# Patient Record
Sex: FEMALE | Race: WHITE | NOT HISPANIC OR LATINO
[De-identification: names, ages, dates, MRNs, and addresses within clinical notes are randomized per-mention and may not be internally consistent; named-entity substitution may affect disease eponyms.]

---

## 2020-08-04 PROBLEM — Z00.00 ENCOUNTER FOR PREVENTIVE HEALTH EXAMINATION: Status: ACTIVE | Noted: 2020-08-04

## 2020-08-05 ENCOUNTER — APPOINTMENT (OUTPATIENT)
Dept: ANTEPARTUM | Facility: CLINIC | Age: 30
End: 2020-08-05
Payer: COMMERCIAL

## 2020-08-05 ENCOUNTER — ASOB RESULT (OUTPATIENT)
Age: 30
End: 2020-08-05

## 2020-08-05 PROCEDURE — 76817 TRANSVAGINAL US OBSTETRIC: CPT

## 2020-08-05 PROCEDURE — 76811 OB US DETAILED SNGL FETUS: CPT

## 2020-12-13 ENCOUNTER — OUTPATIENT (OUTPATIENT)
Dept: OUTPATIENT SERVICES | Facility: HOSPITAL | Age: 30
LOS: 1 days | End: 2020-12-13
Payer: COMMERCIAL

## 2020-12-13 VITALS — HEART RATE: 83 BPM | SYSTOLIC BLOOD PRESSURE: 129 MMHG | DIASTOLIC BLOOD PRESSURE: 85 MMHG

## 2020-12-13 VITALS
TEMPERATURE: 98 F | RESPIRATION RATE: 19 BRPM | HEART RATE: 103 BPM | DIASTOLIC BLOOD PRESSURE: 86 MMHG | SYSTOLIC BLOOD PRESSURE: 125 MMHG

## 2020-12-13 DIAGNOSIS — O26.899 OTHER SPECIFIED PREGNANCY RELATED CONDITIONS, UNSPECIFIED TRIMESTER: ICD-10-CM

## 2020-12-13 DIAGNOSIS — Z98.890 OTHER SPECIFIED POSTPROCEDURAL STATES: Chronic | ICD-10-CM

## 2020-12-13 DIAGNOSIS — Z3A.00 WEEKS OF GESTATION OF PREGNANCY NOT SPECIFIED: ICD-10-CM

## 2020-12-13 PROCEDURE — G0463: CPT

## 2020-12-13 PROCEDURE — 59025 FETAL NON-STRESS TEST: CPT

## 2020-12-13 NOTE — OB PROVIDER TRIAGE NOTE - NSOBPROVIDERNOTE_OBGYN_ALL_OB_FT
29yo  @39w presenting c/o leakage of fluid. Reports that at 3am, she went to void and felt a gush of fluid in toilet. Leakage stopped. When she woke up in the morning her underwear was wet. Denies vaginal bleeding. Denies contractions. Endorses good fetal movement. Reports an otherwise uncomplicated pregnancy.    GBS neg    OBHx: H/o chemical pregnancy (2016), H/o mab w/ D+C (2019).  GYNHx: Denies  MHx: Denies  SHx: D+C (2019), L knee surgery (lateral release)  PsychHx: Denies  SocialHx: Denies    Allergies: Denies  Meds: PNV    ICU Vital Signs Last 24 Hrs  T(C): 36.8 (13 Dec 2020 13:21), Max: 36.8 (13 Dec 2020 13:02)  T(F): 98.2 (13 Dec 2020 13:21), Max: 98.24 (13 Dec 2020 13:02)  HR: 94 (13 Dec 2020 13:27) (88 - 103)  BP: 125/86 (13 Dec 2020 13:21) (125/86 - 125/86)  BP(mean): --  ABP: --  ABP(mean): --  RR: 19 (13 Dec 2020 13:21) (19 - 19)  SpO2: 93% (13 Dec 2020 13:27) (92% - 96%)    Physical Exam:  General: NAD, well appearing  Heart: RRR  Lungs: CTA b/l  Abdomen: Soft, gravid, NT, NR, NG  Extremities: No calf tenderness b/l    SSE: No pooling, nitrizine positive (blood staining), ferning neg    SVE: /-3    Assessment:  29yo  @39w r/o PROM. Vitals stable.    Plan:  -NST  -LEO on sono  -Dispo pending    D/w Dr. Reyes (PMD)    Claudette Goyal PA-C 29yo  @39w presenting c/o leakage of fluid. Reports that at 3am, she went to void and felt a gush of fluid in toilet. Leakage stopped. When she woke up in the morning her underwear was wet. Denies vaginal bleeding. Denies contractions. Endorses good fetal movement. Reports an otherwise uncomplicated pregnancy.    GBS neg    OBHx: H/o chemical pregnancy (2016), H/o mab w/ D+C (2019).  GYNHx: Denies  MHx: Denies  SHx: D+C (2019), L knee surgery (lateral release)  PsychHx: Denies  SocialHx: Denies    Allergies: Denies  Meds: PNV    ICU Vital Signs Last 24 Hrs  T(C): 36.8 (13 Dec 2020 13:21), Max: 36.8 (13 Dec 2020 13:02)  T(F): 98.2 (13 Dec 2020 13:21), Max: 98.24 (13 Dec 2020 13:02)  HR: 94 (13 Dec 2020 13:27) (88 - 103)  BP: 125/86 (13 Dec 2020 13:21) (125/86 - 125/86)  BP(mean): --  ABP: --  ABP(mean): --  RR: 19 (13 Dec 2020 13:21) (19 - 19)  SpO2: 93% (13 Dec 2020 13:27) (92% - 96%)    Physical Exam:  General: NAD, well appearing  Heart: RRR  Lungs: CTA b/l  Abdomen: Soft, gravid, NT, NR, NG  Extremities: No calf tenderness b/l    SSE: No pooling, nitrizine positive (blood staining), ferning neg    SVE: /-3    Assessment:  29yo  @39w r/o PROM. Vitals stable.    Plan:  -NST  -LEO on sono  -Dispo pending    D/w Dr. Reyes (PMD)    Claudette Goyal PA-C      ---      Addendum:   Sono: Vertex, LEO 10.08, anterior placenta    Plan:  -D/c home w/ labor precautions    D/w Dr. SULY Reyes (MD)    Claudette Goyal Pa-C

## 2020-12-13 NOTE — OB PROVIDER TRIAGE NOTE - HISTORY OF PRESENT ILLNESS
31yo  @39w presenting c/o leakage of fluid. Reports that at 3am, she went to void and felt a gush of fluid in toilet. Leakage stopped. When she woke up in the morning her underwear was wet. Denies vaginal bleeding. Denies contractions. Endorses good fetal movement. Reports an otherwise uncomplicated pregnancy.    GBS neg    OBHx: H/o chemical pregnancy (2016), H/o mab w/ D+C (2019).  GYNHx: Denies  MHx: Denies  SHx: D+C (2019), L knee surgery (lateral release)  PsychHx: Denies  SocialHx: Denies    Allergies: Denies  Meds: PNV    ICU Vital Signs Last 24 Hrs  T(C): 36.8 (13 Dec 2020 13:21), Max: 36.8 (13 Dec 2020 13:02)  T(F): 98.2 (13 Dec 2020 13:21), Max: 98.24 (13 Dec 2020 13:02)  HR: 94 (13 Dec 2020 13:27) (88 - 103)  BP: 125/86 (13 Dec 2020 13:21) (125/86 - 125/86)  BP(mean): --  ABP: --  ABP(mean): --  RR: 19 (13 Dec 2020 13:21) (19 - 19)  SpO2: 93% (13 Dec 2020 13:27) (92% - 96%)    Physical Exam:  General: NAD, well appearing  Heart: RRR  Lungs: CTA b/l  Abdomen: Soft, gravid, NT, NR, NG  Extremities: No calf tenderness b/l    SSE: No pooling, nitrizine positive (blood staining), ferning neg    SVE: /-3    Assessment:  31yo  @39w r/o PROM. Vitals stable.    Plan:  -NST  -LEO on sono  -Dispo pending    D/w Dr. Reyes (PMD)    Claudette Goyal PA-C

## 2020-12-14 ENCOUNTER — TRANSCRIPTION ENCOUNTER (OUTPATIENT)
Age: 30
End: 2020-12-14

## 2020-12-15 ENCOUNTER — INPATIENT (INPATIENT)
Facility: HOSPITAL | Age: 30
LOS: 0 days | Discharge: HOME | End: 2020-12-16
Attending: OBSTETRICS & GYNECOLOGY | Admitting: OBSTETRICS & GYNECOLOGY
Payer: COMMERCIAL

## 2020-12-15 VITALS
RESPIRATION RATE: 18 BRPM | DIASTOLIC BLOOD PRESSURE: 97 MMHG | SYSTOLIC BLOOD PRESSURE: 138 MMHG | OXYGEN SATURATION: 99 % | HEART RATE: 105 BPM | TEMPERATURE: 99 F

## 2020-12-15 DIAGNOSIS — Z34.80 ENCOUNTER FOR SUPERVISION OF OTHER NORMAL PREGNANCY, UNSPECIFIED TRIMESTER: ICD-10-CM

## 2020-12-15 DIAGNOSIS — Z3A.00 WEEKS OF GESTATION OF PREGNANCY NOT SPECIFIED: ICD-10-CM

## 2020-12-15 DIAGNOSIS — Z98.890 OTHER SPECIFIED POSTPROCEDURAL STATES: Chronic | ICD-10-CM

## 2020-12-15 DIAGNOSIS — O26.899 OTHER SPECIFIED PREGNANCY RELATED CONDITIONS, UNSPECIFIED TRIMESTER: ICD-10-CM

## 2020-12-15 LAB
ALBUMIN SERPL ELPH-MCNC: 3.8 G/DL — SIGNIFICANT CHANGE UP (ref 3.3–5)
ALP SERPL-CCNC: 147 U/L — HIGH (ref 40–120)
ALT FLD-CCNC: 16 U/L — SIGNIFICANT CHANGE UP (ref 10–45)
ANION GAP SERPL CALC-SCNC: 15 MMOL/L — SIGNIFICANT CHANGE UP (ref 5–17)
APTT BLD: 27 SEC — LOW (ref 27.5–35.5)
AST SERPL-CCNC: 23 U/L — SIGNIFICANT CHANGE UP (ref 10–40)
BASOPHILS # BLD AUTO: 0.1 K/UL — SIGNIFICANT CHANGE UP (ref 0–0.2)
BASOPHILS NFR BLD AUTO: 0.7 % — SIGNIFICANT CHANGE UP (ref 0–2)
BILIRUB SERPL-MCNC: 0.2 MG/DL — SIGNIFICANT CHANGE UP (ref 0.2–1.2)
BLD GP AB SCN SERPL QL: NEGATIVE — SIGNIFICANT CHANGE UP
BUN SERPL-MCNC: 7 MG/DL — SIGNIFICANT CHANGE UP (ref 7–23)
CALCIUM SERPL-MCNC: 10 MG/DL — SIGNIFICANT CHANGE UP (ref 8.4–10.5)
CHLORIDE SERPL-SCNC: 102 MMOL/L — SIGNIFICANT CHANGE UP (ref 96–108)
CO2 SERPL-SCNC: 19 MMOL/L — LOW (ref 22–31)
CREAT SERPL-MCNC: 0.54 MG/DL — SIGNIFICANT CHANGE UP (ref 0.5–1.3)
EOSINOPHIL # BLD AUTO: 0.25 K/UL — SIGNIFICANT CHANGE UP (ref 0–0.5)
EOSINOPHIL NFR BLD AUTO: 1.7 % — SIGNIFICANT CHANGE UP (ref 0–6)
FIBRINOGEN PPP-MCNC: 894 MG/DL — HIGH (ref 290–520)
GLUCOSE SERPL-MCNC: 91 MG/DL — SIGNIFICANT CHANGE UP (ref 70–99)
HCT VFR BLD CALC: 39.7 % — SIGNIFICANT CHANGE UP (ref 34.5–45)
HGB BLD-MCNC: 13.1 G/DL — SIGNIFICANT CHANGE UP (ref 11.5–15.5)
IMM GRANULOCYTES NFR BLD AUTO: 3.9 % — HIGH (ref 0–1.5)
INR BLD: 1 RATIO — SIGNIFICANT CHANGE UP (ref 0.88–1.16)
LDH SERPL L TO P-CCNC: 188 U/L — SIGNIFICANT CHANGE UP (ref 50–242)
LYMPHOCYTES # BLD AUTO: 17.3 % — SIGNIFICANT CHANGE UP (ref 13–44)
LYMPHOCYTES # BLD AUTO: 2.5 K/UL — SIGNIFICANT CHANGE UP (ref 1–3.3)
MCHC RBC-ENTMCNC: 29 PG — SIGNIFICANT CHANGE UP (ref 27–34)
MCHC RBC-ENTMCNC: 33 GM/DL — SIGNIFICANT CHANGE UP (ref 32–36)
MCV RBC AUTO: 88 FL — SIGNIFICANT CHANGE UP (ref 80–100)
MONOCYTES # BLD AUTO: 1.39 K/UL — HIGH (ref 0–0.9)
MONOCYTES NFR BLD AUTO: 9.6 % — SIGNIFICANT CHANGE UP (ref 2–14)
NEUTROPHILS # BLD AUTO: 9.68 K/UL — HIGH (ref 1.8–7.4)
NEUTROPHILS NFR BLD AUTO: 66.8 % — SIGNIFICANT CHANGE UP (ref 43–77)
NRBC # BLD: 0 /100 WBCS — SIGNIFICANT CHANGE UP (ref 0–0)
PLATELET # BLD AUTO: 242 K/UL — SIGNIFICANT CHANGE UP (ref 150–400)
POTASSIUM SERPL-MCNC: 3.7 MMOL/L — SIGNIFICANT CHANGE UP (ref 3.5–5.3)
POTASSIUM SERPL-SCNC: 3.7 MMOL/L — SIGNIFICANT CHANGE UP (ref 3.5–5.3)
PROT SERPL-MCNC: 6.5 G/DL — SIGNIFICANT CHANGE UP (ref 6–8.3)
PROTHROM AB SERPL-ACNC: 12 SEC — SIGNIFICANT CHANGE UP (ref 10.6–13.6)
RBC # BLD: 4.51 M/UL — SIGNIFICANT CHANGE UP (ref 3.8–5.2)
RBC # FLD: 13.9 % — SIGNIFICANT CHANGE UP (ref 10.3–14.5)
RH IG SCN BLD-IMP: POSITIVE — SIGNIFICANT CHANGE UP
SARS-COV-2 IGG SERPL QL IA: NEGATIVE — SIGNIFICANT CHANGE UP
SARS-COV-2 IGM SERPL IA-ACNC: 0.14 INDEX — SIGNIFICANT CHANGE UP
SARS-COV-2 RNA SPEC QL NAA+PROBE: SIGNIFICANT CHANGE UP
SODIUM SERPL-SCNC: 136 MMOL/L — SIGNIFICANT CHANGE UP (ref 135–145)
T PALLIDUM AB TITR SER: NEGATIVE — SIGNIFICANT CHANGE UP
URATE SERPL-MCNC: 3.3 MG/DL — SIGNIFICANT CHANGE UP (ref 2.5–7)
WBC # BLD: 14.49 K/UL — HIGH (ref 3.8–10.5)
WBC # FLD AUTO: 14.49 K/UL — HIGH (ref 3.8–10.5)

## 2020-12-15 RX ORDER — LANOLIN
1 OINTMENT (GRAM) TOPICAL EVERY 6 HOURS
Refills: 0 | Status: DISCONTINUED | OUTPATIENT
Start: 2020-12-15 | End: 2020-12-16

## 2020-12-15 RX ORDER — MAGNESIUM HYDROXIDE 400 MG/1
30 TABLET, CHEWABLE ORAL
Refills: 0 | Status: DISCONTINUED | OUTPATIENT
Start: 2020-12-15 | End: 2020-12-16

## 2020-12-15 RX ORDER — OXYCODONE HYDROCHLORIDE 5 MG/1
5 TABLET ORAL ONCE
Refills: 0 | Status: DISCONTINUED | OUTPATIENT
Start: 2020-12-15 | End: 2020-12-16

## 2020-12-15 RX ORDER — BENZOCAINE 10 %
1 GEL (GRAM) MUCOUS MEMBRANE EVERY 6 HOURS
Refills: 0 | Status: DISCONTINUED | OUTPATIENT
Start: 2020-12-15 | End: 2020-12-16

## 2020-12-15 RX ORDER — PRAMOXINE HYDROCHLORIDE 150 MG/15G
1 AEROSOL, FOAM RECTAL EVERY 4 HOURS
Refills: 0 | Status: DISCONTINUED | OUTPATIENT
Start: 2020-12-15 | End: 2020-12-16

## 2020-12-15 RX ORDER — SODIUM CHLORIDE 9 MG/ML
1000 INJECTION, SOLUTION INTRAVENOUS
Refills: 0 | Status: DISCONTINUED | OUTPATIENT
Start: 2020-12-15 | End: 2020-12-15

## 2020-12-15 RX ORDER — INFLUENZA VIRUS VACCINE 15; 15; 15; 15 UG/.5ML; UG/.5ML; UG/.5ML; UG/.5ML
0.5 SUSPENSION INTRAMUSCULAR ONCE
Refills: 0 | Status: DISCONTINUED | OUTPATIENT
Start: 2020-12-15 | End: 2020-12-16

## 2020-12-15 RX ORDER — AER TRAVELER 0.5 G/1
1 SOLUTION RECTAL; TOPICAL EVERY 4 HOURS
Refills: 0 | Status: DISCONTINUED | OUTPATIENT
Start: 2020-12-15 | End: 2020-12-16

## 2020-12-15 RX ORDER — HYDROCORTISONE 1 %
1 OINTMENT (GRAM) TOPICAL EVERY 6 HOURS
Refills: 0 | Status: DISCONTINUED | OUTPATIENT
Start: 2020-12-15 | End: 2020-12-16

## 2020-12-15 RX ORDER — SODIUM CHLORIDE 9 MG/ML
3 INJECTION INTRAMUSCULAR; INTRAVENOUS; SUBCUTANEOUS EVERY 8 HOURS
Refills: 0 | Status: DISCONTINUED | OUTPATIENT
Start: 2020-12-15 | End: 2020-12-16

## 2020-12-15 RX ORDER — DIBUCAINE 1 %
1 OINTMENT (GRAM) RECTAL EVERY 6 HOURS
Refills: 0 | Status: DISCONTINUED | OUTPATIENT
Start: 2020-12-15 | End: 2020-12-16

## 2020-12-15 RX ORDER — ONDANSETRON 8 MG/1
4 TABLET, FILM COATED ORAL ONCE
Refills: 0 | Status: COMPLETED | OUTPATIENT
Start: 2020-12-15 | End: 2020-12-15

## 2020-12-15 RX ORDER — CITRIC ACID/SODIUM CITRATE 300-500 MG
15 SOLUTION, ORAL ORAL EVERY 6 HOURS
Refills: 0 | Status: DISCONTINUED | OUTPATIENT
Start: 2020-12-15 | End: 2020-12-15

## 2020-12-15 RX ORDER — DIPHENHYDRAMINE HCL 50 MG
25 CAPSULE ORAL EVERY 6 HOURS
Refills: 0 | Status: DISCONTINUED | OUTPATIENT
Start: 2020-12-15 | End: 2020-12-16

## 2020-12-15 RX ORDER — SIMETHICONE 80 MG/1
80 TABLET, CHEWABLE ORAL EVERY 4 HOURS
Refills: 0 | Status: DISCONTINUED | OUTPATIENT
Start: 2020-12-15 | End: 2020-12-16

## 2020-12-15 RX ORDER — KETOROLAC TROMETHAMINE 30 MG/ML
30 SYRINGE (ML) INJECTION ONCE
Refills: 0 | Status: DISCONTINUED | OUTPATIENT
Start: 2020-12-15 | End: 2020-12-15

## 2020-12-15 RX ORDER — SODIUM CHLORIDE 9 MG/ML
1000 INJECTION, SOLUTION INTRAVENOUS ONCE
Refills: 0 | Status: COMPLETED | OUTPATIENT
Start: 2020-12-15 | End: 2020-12-15

## 2020-12-15 RX ORDER — IBUPROFEN 200 MG
600 TABLET ORAL EVERY 6 HOURS
Refills: 0 | Status: COMPLETED | OUTPATIENT
Start: 2020-12-15 | End: 2021-11-13

## 2020-12-15 RX ORDER — OXYCODONE HYDROCHLORIDE 5 MG/1
5 TABLET ORAL
Refills: 0 | Status: DISCONTINUED | OUTPATIENT
Start: 2020-12-15 | End: 2020-12-16

## 2020-12-15 RX ORDER — OXYTOCIN 10 UNIT/ML
333.33 VIAL (ML) INJECTION
Qty: 20 | Refills: 0 | Status: COMPLETED | OUTPATIENT
Start: 2020-12-15 | End: 2020-12-15

## 2020-12-15 RX ORDER — ACETAMINOPHEN 500 MG
975 TABLET ORAL
Refills: 0 | Status: DISCONTINUED | OUTPATIENT
Start: 2020-12-15 | End: 2020-12-16

## 2020-12-15 RX ORDER — TETANUS TOXOID, REDUCED DIPHTHERIA TOXOID AND ACELLULAR PERTUSSIS VACCINE, ADSORBED 5; 2.5; 8; 8; 2.5 [IU]/.5ML; [IU]/.5ML; UG/.5ML; UG/.5ML; UG/.5ML
0.5 SUSPENSION INTRAMUSCULAR ONCE
Refills: 0 | Status: DISCONTINUED | OUTPATIENT
Start: 2020-12-15 | End: 2020-12-16

## 2020-12-15 RX ORDER — OXYTOCIN 10 UNIT/ML
333.33 VIAL (ML) INJECTION
Qty: 20 | Refills: 0 | Status: DISCONTINUED | OUTPATIENT
Start: 2020-12-15 | End: 2020-12-16

## 2020-12-15 RX ORDER — IBUPROFEN 200 MG
600 TABLET ORAL EVERY 6 HOURS
Refills: 0 | Status: DISCONTINUED | OUTPATIENT
Start: 2020-12-15 | End: 2020-12-16

## 2020-12-15 RX ADMIN — Medication 975 MILLIGRAM(S): at 16:17

## 2020-12-15 RX ADMIN — SODIUM CHLORIDE 1000 MILLILITER(S): 9 INJECTION, SOLUTION INTRAVENOUS at 11:15

## 2020-12-15 RX ADMIN — SODIUM CHLORIDE 125 MILLILITER(S): 9 INJECTION, SOLUTION INTRAVENOUS at 02:10

## 2020-12-15 RX ADMIN — Medication 600 MILLIGRAM(S): at 19:49

## 2020-12-15 RX ADMIN — Medication 975 MILLIGRAM(S): at 20:48

## 2020-12-15 RX ADMIN — Medication 975 MILLIGRAM(S): at 21:48

## 2020-12-15 RX ADMIN — ONDANSETRON 4 MILLIGRAM(S): 8 TABLET, FILM COATED ORAL at 03:50

## 2020-12-15 RX ADMIN — Medication 1000 MILLIUNIT(S)/MIN: at 10:30

## 2020-12-15 RX ADMIN — Medication 600 MILLIGRAM(S): at 18:57

## 2020-12-15 RX ADMIN — Medication 30 MILLIGRAM(S): at 13:05

## 2020-12-15 RX ADMIN — Medication 975 MILLIGRAM(S): at 17:15

## 2020-12-15 NOTE — OB PROVIDER H&P - NSHPPHYSICALEXAM_GEN_ALL_CORE
Vital Signs Last 24 Hrs  T(C): 37.1 (15 Dec 2020 01:27), Max: 37.1 (15 Dec 2020 01:27)  T(F): 98.8 (15 Dec 2020 01:27), Max: 98.8 (15 Dec 2020 01:27)  HR: 105 (15 Dec 2020 01:27) (105 - 105)  BP: 138/97 (15 Dec 2020 01:27) (138/97 - 138/97)  BP(mean): --  RR: 18 (15 Dec 2020 01:27) (18 - 18)  SpO2: 99% (15 Dec 2020 01:27) (99% - 99%)    General: Uncomfortable with ctx's, A&Ox3  CV: RRR  Lungs: CTA b/l  Abdomen: Soft, NT, gravid

## 2020-12-15 NOTE — OB PROVIDER H&P - HISTORY OF PRESENT ILLNESS
30y  @39wks and 2 days gestation presenting with LOF. Patient admits to a gush of clear fluid @1215am with continuous leaking. She admits to regular contractions starting after LOF that she rates as a 10/10. She denies VB. PNC uncomplicated. +FM. GBS -. EFW 8#14 done by ultrasound yesterday.    POBHx: MAB x2 s/p D&Cx1  PGYNHx: Denies fibroids, ovarian cysts, abnormal pap smears, STD's  PMHx: Denies  Medications: PNV  Allergies: NKDA  PSHx: Knee surgery, D&C  Social Hx: Denies etoh/tobacco/drug use     Vital Signs Last 24 Hrs  T(C): 37.1 (15 Dec 2020 01:), Max: 37.1 (15 Dec 2020 01:)  T(F): 98.8 (15 Dec 2020 01:), Max: 98.8 (15 Dec 2020 01:)  HR: 105 (15 Dec 2020 01:) (105 - 105)  BP: 138/97 (15 Dec 2020 01:27) (138/97 - 138/97)  BP(mean): --  RR: 18 (15 Dec 2020 01:) (18 - 18)  SpO2: 99% (15 Dec 2020 01:) (99% - 99%)    General: Uncomfortable with ctx's, A&Ox3  CV: RRR  Lungs: CTA b/l  Abdomen: Soft, NT, gravid    VE: 3/80/-3, grossly ruptured  Bedside sono: Vertex presentation  EFM: 140/moderate variability/+accels/no decels  [place]: q2-4m

## 2020-12-15 NOTE — OB RN DELIVERY SUMMARY - NS_SEPSISRSKCALC_OBGYN_ALL_OB_FT
EOS calculated successfully. EOS Risk Factor: 0.5/1000 live births ([facility] national incidence); GA=39w2d; Temp=98.8; ROM=9.917; GBS='Negative'; Antibiotics='No antibiotics or any antibiotics < 2 hrs prior to birth'

## 2020-12-15 NOTE — OB RN PATIENT PROFILE - THE IMPORTANCE OF THE NEWBORN'S COMFORT AND THERMOREGULATION DURING SKIN TO SKIN: ANY PART OF INFANT SKIN NOT TOUCHING PARENT'S SKIN IS TO BE COVERED BY A BLANKET.
Patient Education     Vaginal Infection: Understanding the Vaginal Environment  The vagina is a canal. It connects the uterus (womb) to the outside of the body. It is home to many types of bacteria and other tiny organisms. These different bacteria most often stay balanced in number. This keeps the vagina healthy. If the balance changes, it can cause infection.   A healthy environment  Many types of bacteria are present in a healthy vagina. When balanced, they don t cause problems. Small amounts of yeast may also be present without causing problems. The most common type of bacteria in the vagina is lactobacillus. It helps keep the vagina at a low pH. A low pH keeps bad bacteria from taking over.  Normal vaginal discharge  The vagina makes fluid. It is sent out as discharge. This also keeps the vagina healthy. Normal discharge can be clear, white, or yellowish. Most women find that normal discharge varies in amount and color through the month.  An unhealthy environment  The vaginal environment may get out of balance. This may result in a vaginal infection. There are a few reasons this can happen. The pH may have changed. The amount of one organism, such as yeast, may increase. Or an outside organism may get into the vagina and throw off the balance:    Bacterial vaginosis (BV). BV is due to an imbalance in the normal bacteria in the vagina. Lactobacillus bacteria decrease. As a result, the numbers of bad bacteria increase.    Candidiasis (yeast infection). Yeast is a type of fungus. A yeast infection occurs when yeast cells in the vagina increase. They then attack vaginal tissues. A type of yeast called Candida albicans is often involved.    Trichomoniasis ( trich ). Trich is a parasite. It is passed from one person to another during sex. Men with trich often don t have any symptoms. In women, it can take weeks or months before symptoms appear.  Cara last reviewed this educational content on 3/1/2017     2250-7785 [employer]. [address] [place] [address], [place] [address]. All rights reserved. This information is not intended as a substitute for professional medical care. Always follow your healthcare professional's instructions.            Statement Selected

## 2020-12-15 NOTE — OB PROVIDER H&P - ASSESSMENT
30y  @39wks and 2 days gestation presenting with SROM@1215am. +FM. GBS -. EFW 4026g  -Admit to L&D  -Routine labs  -EFM/[place]  -NPO, IVF, Bicitra  -Expectant mgmt  -HELLP labs, P:Cr ratio to be sent due to mild range BP's  -Continue to monitor BP's  -Anesthesia consult, for epidural  -Anticipate   -COVID swab  D/w Dr. Liz Acuna PA-C

## 2020-12-15 NOTE — OB PROVIDER LABOR PROGRESS NOTE - NS_SUBJECTIVE/OBJECTIVE_OBGYN_ALL_OB_FT
R4 Labor Note    Pt seen and examined at bedside for cervical exam. Patient feeling more pressure.    T(C): 36.8 (12-15-20 @ 04:02), Max: 37.1 (12-15-20 @ 01:27)  HR: 94 (12-15-20 @ 06:30) (83 - 124)  BP: 104/55 (12-15-20 @ 06:26) (104/55 - 139/90)  RR: 16 (12-15-20 @ 04:02) (16 - 18)  SpO2: 100% (12-15-20 @ 06:30) (91% - 100%)    SVE:  EFM:  [place]:    -     Discussed with Dr. Brad Corral MD PGY4

## 2020-12-15 NOTE — CHART NOTE - NSCHARTNOTEFT_GEN_A_CORE
PA Lab Note:  H/H: 13.1/39.7  Plt: 242  Cr: .54  AST/ALT: 23/16  Uric acid: 3.3  LDH: 188  P/Cr ratio: Pending    A/P:  P0 presenting with SROM and mild range BP's  -EFM/[place]  -HELLP labs WNL. F/u P/Cr ratio  -Continue to monitor BP's. BP's now normotensive s/p epidural  -Continue expectant mgmt  -Anticipate   Melasa Armand BAY

## 2020-12-15 NOTE — OB PROVIDER DELIVERY SUMMARY - NSPROVIDERDELIVERYNOTE_OBGYN_ALL_OB_FT
Vacuum assisted vaginal delivery. Maternal exhaustion was noted as well as OP presentation, and the decision was made for vacuum assisted delivery. The patient verbally consented. The cervix was 10cm dilated, and the fetal vertex was noted to be OP at +3 station. A midline episiotomy was then cut. The vacuum was applied at the flexion point and the head was delivered in 1 pull with no pop offs without exceeding 60 mm Hg. The head was then delivered without complication, and the vacuum was removed. The shoulders and body delivered without complication. Infant was suctioned, no mec. Cord was clamped and cut and infant was passed to pediatrician. Placenta was delivered intact with a 3 vessel cord. Fundal massage was given and the uterus was found to be firm. Vaginal exam revealed an intact cervix, vaginal walls and sulci. Patient had an extension of the episiotomy to a third degree laceration in the perineum. The external anal sphincter muscle was grasped with aliss clamps, and the ends were reapproximated with a figure of eight stitch of 0.0 vicryl suture. The remainder of the laceration was repaired in standard fashion with 2.0 vicryl suture. Excellent hemostasis was noted. The patient was stable and went to recovery. Count was correct x2.    Dictation number: [id number]  Brad Corral MD PGY4

## 2020-12-15 NOTE — OB NEONATOLOGY/PEDIATRICIAN DELIVERY SUMMARY - NSPEDSNEONOTESA_OBGYN_ALL_OB_FT
Baby girl born at 39 2/7 wks via VAVD to a 31 y/o  mother who is O+ blood type, HBsAg neg, HIV neg, rubella immune, RPR neg, GBS neg as of . No significant maternal or prenatal history. SROM at 0015 with clear fluids. Baby emerged vigorous, crying and cord clamping was delayed 30 secs. Infant brought to warmer dried and stimulated. APGARS of 9/ 9. Mom would like to bottle feed and consents to the birth dose of Hep B.

## 2020-12-15 NOTE — OB RN PATIENT PROFILE - NS TRANSFER RESPONSE BELONGING
Problem:  Body Temperature -  Risk of, Imbalanced  Goal: Ability to maintain a body temperature in the normal range will improve to within specified parameters  Description: Ability to maintain a body temperature in the normal range will improve to within specified parameters  2020 1556 by Melody Stone RN  Outcome: Met This Shift
yes

## 2020-12-16 ENCOUNTER — TRANSCRIPTION ENCOUNTER (OUTPATIENT)
Age: 30
End: 2020-12-16

## 2020-12-16 VITALS
SYSTOLIC BLOOD PRESSURE: 111 MMHG | HEART RATE: 100 BPM | OXYGEN SATURATION: 99 % | DIASTOLIC BLOOD PRESSURE: 71 MMHG | TEMPERATURE: 98 F | RESPIRATION RATE: 18 BRPM

## 2020-12-16 DIAGNOSIS — Z37.9 OUTCOME OF DELIVERY, UNSPECIFIED: ICD-10-CM

## 2020-12-16 PROCEDURE — 84550 ASSAY OF BLOOD/URIC ACID: CPT

## 2020-12-16 PROCEDURE — 80053 COMPREHEN METABOLIC PANEL: CPT

## 2020-12-16 PROCEDURE — 86780 TREPONEMA PALLIDUM: CPT

## 2020-12-16 PROCEDURE — 86850 RBC ANTIBODY SCREEN: CPT

## 2020-12-16 PROCEDURE — 85025 COMPLETE CBC W/AUTO DIFF WBC: CPT

## 2020-12-16 PROCEDURE — G0463: CPT

## 2020-12-16 PROCEDURE — 86901 BLOOD TYPING SEROLOGIC RH(D): CPT

## 2020-12-16 PROCEDURE — 83615 LACTATE (LD) (LDH) ENZYME: CPT

## 2020-12-16 PROCEDURE — 59050 FETAL MONITOR W/REPORT: CPT

## 2020-12-16 PROCEDURE — 86769 SARS-COV-2 COVID-19 ANTIBODY: CPT

## 2020-12-16 PROCEDURE — 85730 THROMBOPLASTIN TIME PARTIAL: CPT

## 2020-12-16 PROCEDURE — 86900 BLOOD TYPING SEROLOGIC ABO: CPT

## 2020-12-16 PROCEDURE — 85610 PROTHROMBIN TIME: CPT

## 2020-12-16 PROCEDURE — 59025 FETAL NON-STRESS TEST: CPT

## 2020-12-16 PROCEDURE — 85384 FIBRINOGEN ACTIVITY: CPT

## 2020-12-16 PROCEDURE — 87635 SARS-COV-2 COVID-19 AMP PRB: CPT

## 2020-12-16 RX ORDER — IBUPROFEN 200 MG
1 TABLET ORAL
Qty: 0 | Refills: 0 | DISCHARGE
Start: 2020-12-16

## 2020-12-16 RX ORDER — ACETAMINOPHEN 500 MG
3 TABLET ORAL
Qty: 0 | Refills: 0 | DISCHARGE
Start: 2020-12-16

## 2020-12-16 RX ADMIN — Medication 975 MILLIGRAM(S): at 11:19

## 2020-12-16 RX ADMIN — Medication 975 MILLIGRAM(S): at 05:36

## 2020-12-16 RX ADMIN — Medication 975 MILLIGRAM(S): at 04:36

## 2020-12-16 RX ADMIN — Medication 600 MILLIGRAM(S): at 00:18

## 2020-12-16 RX ADMIN — Medication 600 MILLIGRAM(S): at 05:53

## 2020-12-16 RX ADMIN — Medication 600 MILLIGRAM(S): at 01:02

## 2020-12-16 RX ADMIN — Medication 600 MILLIGRAM(S): at 13:04

## 2020-12-16 RX ADMIN — Medication 600 MILLIGRAM(S): at 06:53

## 2020-12-16 RX ADMIN — Medication 975 MILLIGRAM(S): at 10:21

## 2020-12-16 RX ADMIN — Medication 1 TABLET(S): at 10:21

## 2020-12-16 NOTE — DISCHARGE NOTE OB - CARE PLAN
Principal Discharge DX:	Vacuum-assisted vaginal delivery  Goal:	recovery  Assessment and plan of treatment:	reg diet, ambulating, pain control

## 2020-12-16 NOTE — DISCHARGE NOTE OB - CARE PROVIDER_API CALL
Jodie Reyes)  [facility]  [address] [place] [address], [address]  [place] [address]  Phone: [phone]  Fax: [phone]  Follow Up Time:

## 2020-12-16 NOTE — PROGRESS NOTE ADULT - SUBJECTIVE AND OBJECTIVE BOX
Postpartum Note- PPD#1    Allergies    No Known Allergies    Intolerances          Rubella IgG:  Immune       RPR:  Negative       Blood Type:  O+    S:Patient is a  30y   G 3   P 1     PPD#1         S/P    VAVD   Patient w/o complaints, pain is controlled.    Pt is OOB, tolerating PO, passing flatus. Lochia WNL.   Feeding: Breastfeeding    O:  Vital Signs Last 24 Hrs  T(C): 36.7 (16 Dec 2020 05:47), Max: 37.3 (15 Dec 2020 14:15)  T(F): 98 (16 Dec 2020 05:47), Max: 99.1 (15 Dec 2020 14:15)  HR: 100 (16 Dec 2020 05:47) (68 - 161)  BP: 111/71 (16 Dec 2020 05:47) (55/29 - 144/87)  BP(mean): 87 (15 Dec 2020 13:15) (87 - 87)  RR: 18 (16 Dec 2020 05:47) (16 - 18)  SpO2: 99% (16 Dec 2020 05:47) (81% - 100%)     Gen: NAD  CV: rrr s1s2, CTABL  Abdomen: Soft, nontender, non-distended, fundus firm.  Lochia: WNL  Perineum: third degree laceration  Ext: Neg edema, Neg calf tenderness.  Pedal pulses palpated B/L    LABS:    Hemoglobin: 13.1 g/dL (12-15 @ 02:19)      Hematocrit: 39.7 % (12-15 @ 02:19)      A/P:  30y  PPD # 1      S/P    VAVD,    doing well    PMHx: none  Current Issues: none    Increase OOB  Regular diet  PO Pain protocol  AM H&H  Routine Postpartum Care

## 2020-12-16 NOTE — DISCHARGE NOTE OB - PATIENT PORTAL LINK FT
You can access the FollowMyHealth Patient Portal offered by [facility] by registering at the following website: [email or web address]. By joining [facility]’s FollowMyHealth portal, you will also be able to view your health information using other applications (apps) compatible with our system.

## 2020-12-16 NOTE — DISCHARGE NOTE OB - MATERIALS PROVIDED
Vaccinations/[facility]  Screening Program/Guide to Postpartum Care/[facility] Hearing Screen Program/Back To Sleep Handout/Shaken Baby Prevention Handout/Birth Certificate Instructions

## 2020-12-16 NOTE — DISCHARGE NOTE OB - MEDICATION SUMMARY - MEDICATIONS TO TAKE
I will START or STAY ON the medications listed below when I get home from the hospital:    acetaminophen 325 mg oral tablet  -- 3 tab(s) by mouth   -- Indication: For pain    ibuprofen 600 mg oral tablet  -- 1 tab(s) by mouth every 6 hours  -- Indication: For pain    Prenatal Multivitamins oral tablet  -- Indication: For Vitamin

## 2020-12-27 ENCOUNTER — TRANSCRIPTION ENCOUNTER (OUTPATIENT)
Age: 30
End: 2020-12-27

## 2021-05-02 ENCOUNTER — RESULT REVIEW (OUTPATIENT)
Age: 31
End: 2021-05-02

## 2022-07-17 ENCOUNTER — RESULT REVIEW (OUTPATIENT)
Age: 32
End: 2022-07-17

## 2022-08-23 NOTE — DISCHARGE NOTE OB - NS OB DC IMMUNIZATIONS2 YN
[FreeTextEntry1] : -PMH: HTN, Anxiety, Insomnia, Osteoporosis, Carpal Tunnel \par -SH: . 2 children. Unemployed. Non-smoker. She does smoke Marijuana daily for the last 30+ yrs.\par \par BLANCA is a 56 year F whom is here today for f/u Osteoporosis, HTN after starting med\par \par Specialists:\par -OBGYN: Still Needs to establish care\par -GI: Dr. Alphonso Quinonez ([phone])\par -Rheum: Dr. Terrance Rahman\par \par -F/u labs drawn in office today\par -Further recs pending lab results\par -Still needs to f/u w/ GYN for Pap (Ref given again today)\par -RTO 6mo for routine f/u or sooner if needed
No

## 2023-05-19 ENCOUNTER — NON-APPOINTMENT (OUTPATIENT)
Age: 33
End: 2023-05-19

## 2023-06-14 PROBLEM — O02.1 MISSED ABORTION: Chronic | Status: ACTIVE | Noted: 2020-12-15

## 2023-07-19 ENCOUNTER — ASOB RESULT (OUTPATIENT)
Age: 33
End: 2023-07-19

## 2023-07-19 ENCOUNTER — APPOINTMENT (OUTPATIENT)
Dept: OBGYN | Facility: CLINIC | Age: 33
End: 2023-07-19
Payer: COMMERCIAL

## 2023-07-19 ENCOUNTER — APPOINTMENT (OUTPATIENT)
Dept: ANTEPARTUM | Facility: CLINIC | Age: 33
End: 2023-07-19
Payer: COMMERCIAL

## 2023-07-19 PROCEDURE — 76811 OB US DETAILED SNGL FETUS: CPT

## 2023-07-19 PROCEDURE — 0502F SUBSEQUENT PRENATAL CARE: CPT

## 2023-07-26 ENCOUNTER — APPOINTMENT (OUTPATIENT)
Dept: OBGYN | Facility: CLINIC | Age: 33
End: 2023-07-26
Payer: COMMERCIAL

## 2023-07-26 PROCEDURE — 36415 COLL VENOUS BLD VENIPUNCTURE: CPT

## 2023-08-16 ENCOUNTER — APPOINTMENT (OUTPATIENT)
Dept: OBGYN | Facility: CLINIC | Age: 33
End: 2023-08-16
Payer: COMMERCIAL

## 2023-08-16 PROCEDURE — 0502F SUBSEQUENT PRENATAL CARE: CPT

## 2023-09-12 ENCOUNTER — APPOINTMENT (OUTPATIENT)
Dept: OBGYN | Facility: CLINIC | Age: 33
End: 2023-09-12
Payer: COMMERCIAL

## 2023-09-12 PROCEDURE — 90471 IMMUNIZATION ADMIN: CPT

## 2023-09-12 PROCEDURE — 76819 FETAL BIOPHYS PROFIL W/O NST: CPT | Mod: 59

## 2023-09-12 PROCEDURE — 76816 OB US FOLLOW-UP PER FETUS: CPT

## 2023-09-12 PROCEDURE — 0502F SUBSEQUENT PRENATAL CARE: CPT

## 2023-09-12 PROCEDURE — 36415 COLL VENOUS BLD VENIPUNCTURE: CPT

## 2023-09-12 PROCEDURE — 90715 TDAP VACCINE 7 YRS/> IM: CPT

## 2023-10-11 ENCOUNTER — APPOINTMENT (OUTPATIENT)
Dept: OBGYN | Facility: CLINIC | Age: 33
End: 2023-10-11
Payer: COMMERCIAL

## 2023-10-11 PROCEDURE — 76816 OB US FOLLOW-UP PER FETUS: CPT

## 2023-10-11 PROCEDURE — 0502F SUBSEQUENT PRENATAL CARE: CPT

## 2023-10-11 PROCEDURE — 76819 FETAL BIOPHYS PROFIL W/O NST: CPT | Mod: 59

## 2023-10-26 ENCOUNTER — APPOINTMENT (OUTPATIENT)
Dept: OBGYN | Facility: CLINIC | Age: 33
End: 2023-10-26
Payer: COMMERCIAL

## 2023-10-26 PROCEDURE — 59025 FETAL NON-STRESS TEST: CPT

## 2023-10-26 PROCEDURE — 99213 OFFICE O/P EST LOW 20 MIN: CPT | Mod: 25

## 2023-10-27 ENCOUNTER — APPOINTMENT (OUTPATIENT)
Dept: OBGYN | Facility: CLINIC | Age: 33
End: 2023-10-27

## 2023-11-09 ENCOUNTER — APPOINTMENT (OUTPATIENT)
Dept: OBGYN | Facility: CLINIC | Age: 33
End: 2023-11-09
Payer: COMMERCIAL

## 2023-11-09 PROCEDURE — 0502F SUBSEQUENT PRENATAL CARE: CPT

## 2023-11-09 PROCEDURE — 76816 OB US FOLLOW-UP PER FETUS: CPT

## 2023-11-09 PROCEDURE — 76818 FETAL BIOPHYS PROFILE W/NST: CPT | Mod: 59

## 2023-11-15 ENCOUNTER — APPOINTMENT (OUTPATIENT)
Dept: OBGYN | Facility: CLINIC | Age: 33
End: 2023-11-15
Payer: COMMERCIAL

## 2023-11-15 ENCOUNTER — OUTPATIENT (OUTPATIENT)
Dept: OUTPATIENT SERVICES | Facility: HOSPITAL | Age: 33
LOS: 1 days | End: 2023-11-15
Payer: COMMERCIAL

## 2023-11-15 VITALS
DIASTOLIC BLOOD PRESSURE: 75 MMHG | TEMPERATURE: 98 F | RESPIRATION RATE: 18 BRPM | HEART RATE: 81 BPM | OXYGEN SATURATION: 98 % | SYSTOLIC BLOOD PRESSURE: 124 MMHG

## 2023-11-15 VITALS — DIASTOLIC BLOOD PRESSURE: 68 MMHG | HEART RATE: 77 BPM | OXYGEN SATURATION: 93 % | SYSTOLIC BLOOD PRESSURE: 130 MMHG

## 2023-11-15 DIAGNOSIS — Z98.890 OTHER SPECIFIED POSTPROCEDURAL STATES: Chronic | ICD-10-CM

## 2023-11-15 DIAGNOSIS — O26.899 OTHER SPECIFIED PREGNANCY RELATED CONDITIONS, UNSPECIFIED TRIMESTER: ICD-10-CM

## 2023-11-15 PROCEDURE — 59025 FETAL NON-STRESS TEST: CPT | Mod: 26

## 2023-11-15 PROCEDURE — 76818 FETAL BIOPHYS PROFILE W/NST: CPT

## 2023-11-15 PROCEDURE — 0502F SUBSEQUENT PRENATAL CARE: CPT

## 2023-11-15 PROCEDURE — 99221 1ST HOSP IP/OBS SF/LOW 40: CPT | Mod: 25

## 2023-11-15 PROCEDURE — 59025 FETAL NON-STRESS TEST: CPT

## 2023-11-15 PROCEDURE — G0463: CPT

## 2023-11-15 NOTE — OB PROVIDER TRIAGE NOTE - NSOBPROVIDERNOTE_OBGYN_ALL_OB_FT
A/P: 34yo  @ 37w0d here for prolonged monitoring x 1hr  - EFM: reactive   - Pt to be discharged to home with labor precautions and reasons to come to hospital    d/w Dr. Liz Peng, PA-C

## 2023-11-15 NOTE — OB PROVIDER TRIAGE NOTE - ABORTIONS, OB PROFILE
----- Message from Lindsey Gil MA sent at 3/27/2017  3:53 PM CDT -----  Contact: Dina(wife) [phone]   Patient needs prep sent to pharmacy, [facility], in computer. Procedure is scheduled for Wednesday March 29   ----- Message -----     From: Orville Calvin     Sent: 3/27/2017   2:47 PM       To: [facility] Staff    Wife(Dina) called to speak with someone about medication for 's upcoming procedure.  Please advise.    
2

## 2023-11-15 NOTE — OB PROVIDER TRIAGE NOTE - NSHPPHYSICALEXAM_GEN_ALL_CORE
PE:    T(C): 36.6 (11-15-23 @ 19:24), Max: 36.6 (11-15-23 @ 19:19)  HR: 88 (11-15-23 @ 20:34) (68 - 96)  BP: 124/75 (11-15-23 @ 19:24) (124/75 - 124/75)  RR: 18 (11-15-23 @ 19:19) (18 - 18)  SpO2: 98% (11-15-23 @ 20:34) (97% - 100%)    General: NAD, A&Ox3  CV: RRR  Lungs: Clear bilat   Abd: soft, nontender, gravid  VE: deferred  EFM: 120/moderate variablity/+accels/-decels  TOCO: q3-5mins

## 2023-11-15 NOTE — OB PROVIDER TRIAGE NOTE - HISTORY OF PRESENT ILLNESS
Pt is a 32yo  @ 37w0d here for prolonged monitoring sent from the PN office for NRFHT. Pt. denies LOF, VB, CTX. +FM. PNC uncomplicated. GBS not performed. EFW 3500g    OBHx: 2012: eTOP; 2019: miscarriage; 2020: , FT, 9#1  GYNHx: +hx HPV (+); denies ovarian cysts, fibroids, or STDs  PMHx: Anemic  PSurgHx: 2007: Knee sx; 2019: D&C  Allergies: NKDA  Meds: PNV, Iron  Social: No smoking, alcohol, or illicit drug use  Psych: No hx of anxiety or depression No. ARCHANA screening performed.  STOP BANG Legend: 0-2 = LOW Risk; 3-4 = INTERMEDIATE Risk; 5-8 = HIGH Risk

## 2023-11-16 DIAGNOSIS — O36.8330 MATERNAL CARE FOR ABNORMALITIES OF THE FETAL HEART RATE OR RHYTHM, THIRD TRIMESTER, NOT APPLICABLE OR UNSPECIFIED: ICD-10-CM

## 2023-11-16 DIAGNOSIS — D64.9 ANEMIA, UNSPECIFIED: ICD-10-CM

## 2023-11-16 DIAGNOSIS — O09.523 SUPERVISION OF ELDERLY MULTIGRAVIDA, THIRD TRIMESTER: ICD-10-CM

## 2023-11-16 DIAGNOSIS — O09.293 SUPERVISION OF PREGNANCY WITH OTHER POOR REPRODUCTIVE OR OBSTETRIC HISTORY, THIRD TRIMESTER: ICD-10-CM

## 2023-11-16 DIAGNOSIS — Z3A.37 37 WEEKS GESTATION OF PREGNANCY: ICD-10-CM

## 2023-11-16 DIAGNOSIS — O99.013 ANEMIA COMPLICATING PREGNANCY, THIRD TRIMESTER: ICD-10-CM

## 2023-11-22 ENCOUNTER — APPOINTMENT (OUTPATIENT)
Dept: OBGYN | Facility: CLINIC | Age: 33
End: 2023-11-22
Payer: COMMERCIAL

## 2023-11-22 PROCEDURE — 59426 ANTEPARTUM CARE ONLY: CPT

## 2023-11-22 PROCEDURE — 76818 FETAL BIOPHYS PROFILE W/NST: CPT | Mod: 59

## 2023-11-22 PROCEDURE — 76816 OB US FOLLOW-UP PER FETUS: CPT

## 2023-11-22 PROCEDURE — 0502F SUBSEQUENT PRENATAL CARE: CPT

## 2023-11-24 ENCOUNTER — APPOINTMENT (OUTPATIENT)
Dept: OBGYN | Facility: CLINIC | Age: 33
End: 2023-11-24

## 2023-11-27 ENCOUNTER — APPOINTMENT (OUTPATIENT)
Dept: OBGYN | Facility: CLINIC | Age: 33
End: 2023-11-27

## 2023-11-27 ENCOUNTER — INPATIENT (INPATIENT)
Facility: HOSPITAL | Age: 33
LOS: 1 days | Discharge: HOME | End: 2023-11-29
Attending: OBSTETRICS & GYNECOLOGY | Admitting: OBSTETRICS & GYNECOLOGY
Payer: COMMERCIAL

## 2023-11-27 VITALS — DIASTOLIC BLOOD PRESSURE: 87 MMHG | HEART RATE: 111 BPM | SYSTOLIC BLOOD PRESSURE: 142 MMHG

## 2023-11-27 DIAGNOSIS — O26.899 OTHER SPECIFIED PREGNANCY RELATED CONDITIONS, UNSPECIFIED TRIMESTER: ICD-10-CM

## 2023-11-27 DIAGNOSIS — Z34.80 ENCOUNTER FOR SUPERVISION OF OTHER NORMAL PREGNANCY, UNSPECIFIED TRIMESTER: ICD-10-CM

## 2023-11-27 DIAGNOSIS — Z98.890 OTHER SPECIFIED POSTPROCEDURAL STATES: Chronic | ICD-10-CM

## 2023-11-27 LAB
BASOPHILS # BLD AUTO: 0.07 K/UL — SIGNIFICANT CHANGE UP (ref 0–0.2)
BASOPHILS # BLD AUTO: 0.07 K/UL — SIGNIFICANT CHANGE UP (ref 0–0.2)
BASOPHILS NFR BLD AUTO: 0.5 % — SIGNIFICANT CHANGE UP (ref 0–2)
BASOPHILS NFR BLD AUTO: 0.5 % — SIGNIFICANT CHANGE UP (ref 0–2)
BLD GP AB SCN SERPL QL: NEGATIVE — SIGNIFICANT CHANGE UP
BLD GP AB SCN SERPL QL: NEGATIVE — SIGNIFICANT CHANGE UP
EOSINOPHIL # BLD AUTO: 0.09 K/UL — SIGNIFICANT CHANGE UP (ref 0–0.5)
EOSINOPHIL # BLD AUTO: 0.09 K/UL — SIGNIFICANT CHANGE UP (ref 0–0.5)
EOSINOPHIL NFR BLD AUTO: 0.6 % — SIGNIFICANT CHANGE UP (ref 0–6)
EOSINOPHIL NFR BLD AUTO: 0.6 % — SIGNIFICANT CHANGE UP (ref 0–6)
HCT VFR BLD CALC: 37.3 % — SIGNIFICANT CHANGE UP (ref 34.5–45)
HCT VFR BLD CALC: 37.3 % — SIGNIFICANT CHANGE UP (ref 34.5–45)
HGB BLD-MCNC: 12.8 G/DL — SIGNIFICANT CHANGE UP (ref 11.5–15.5)
HGB BLD-MCNC: 12.8 G/DL — SIGNIFICANT CHANGE UP (ref 11.5–15.5)
IMM GRANULOCYTES NFR BLD AUTO: 1.5 % — HIGH (ref 0–0.9)
IMM GRANULOCYTES NFR BLD AUTO: 1.5 % — HIGH (ref 0–0.9)
LYMPHOCYTES # BLD AUTO: 16.2 % — SIGNIFICANT CHANGE UP (ref 13–44)
LYMPHOCYTES # BLD AUTO: 16.2 % — SIGNIFICANT CHANGE UP (ref 13–44)
LYMPHOCYTES # BLD AUTO: 2.36 K/UL — SIGNIFICANT CHANGE UP (ref 1–3.3)
LYMPHOCYTES # BLD AUTO: 2.36 K/UL — SIGNIFICANT CHANGE UP (ref 1–3.3)
MCHC RBC-ENTMCNC: 29.4 PG — SIGNIFICANT CHANGE UP (ref 27–34)
MCHC RBC-ENTMCNC: 29.4 PG — SIGNIFICANT CHANGE UP (ref 27–34)
MCHC RBC-ENTMCNC: 34.3 GM/DL — SIGNIFICANT CHANGE UP (ref 32–36)
MCHC RBC-ENTMCNC: 34.3 GM/DL — SIGNIFICANT CHANGE UP (ref 32–36)
MCV RBC AUTO: 85.6 FL — SIGNIFICANT CHANGE UP (ref 80–100)
MCV RBC AUTO: 85.6 FL — SIGNIFICANT CHANGE UP (ref 80–100)
MONOCYTES # BLD AUTO: 1.13 K/UL — HIGH (ref 0–0.9)
MONOCYTES # BLD AUTO: 1.13 K/UL — HIGH (ref 0–0.9)
MONOCYTES NFR BLD AUTO: 7.7 % — SIGNIFICANT CHANGE UP (ref 2–14)
MONOCYTES NFR BLD AUTO: 7.7 % — SIGNIFICANT CHANGE UP (ref 2–14)
NEUTROPHILS # BLD AUTO: 10.73 K/UL — HIGH (ref 1.8–7.4)
NEUTROPHILS # BLD AUTO: 10.73 K/UL — HIGH (ref 1.8–7.4)
NEUTROPHILS NFR BLD AUTO: 73.5 % — SIGNIFICANT CHANGE UP (ref 43–77)
NEUTROPHILS NFR BLD AUTO: 73.5 % — SIGNIFICANT CHANGE UP (ref 43–77)
NRBC # BLD: 0 /100 WBCS — SIGNIFICANT CHANGE UP (ref 0–0)
NRBC # BLD: 0 /100 WBCS — SIGNIFICANT CHANGE UP (ref 0–0)
PLATELET # BLD AUTO: 233 K/UL — SIGNIFICANT CHANGE UP (ref 150–400)
PLATELET # BLD AUTO: 233 K/UL — SIGNIFICANT CHANGE UP (ref 150–400)
RBC # BLD: 4.36 M/UL — SIGNIFICANT CHANGE UP (ref 3.8–5.2)
RBC # BLD: 4.36 M/UL — SIGNIFICANT CHANGE UP (ref 3.8–5.2)
RBC # FLD: 13.6 % — SIGNIFICANT CHANGE UP (ref 10.3–14.5)
RBC # FLD: 13.6 % — SIGNIFICANT CHANGE UP (ref 10.3–14.5)
RH IG SCN BLD-IMP: POSITIVE — SIGNIFICANT CHANGE UP
RH IG SCN BLD-IMP: POSITIVE — SIGNIFICANT CHANGE UP
T PALLIDUM AB TITR SER: NEGATIVE — SIGNIFICANT CHANGE UP
T PALLIDUM AB TITR SER: NEGATIVE — SIGNIFICANT CHANGE UP
WBC # BLD: 14.6 K/UL — HIGH (ref 3.8–10.5)
WBC # BLD: 14.6 K/UL — HIGH (ref 3.8–10.5)
WBC # FLD AUTO: 14.6 K/UL — HIGH (ref 3.8–10.5)
WBC # FLD AUTO: 14.6 K/UL — HIGH (ref 3.8–10.5)

## 2023-11-27 RX ORDER — ACETAMINOPHEN 500 MG
975 TABLET ORAL
Refills: 0 | Status: DISCONTINUED | OUTPATIENT
Start: 2023-11-27 | End: 2023-11-29

## 2023-11-27 RX ORDER — CITRIC ACID/SODIUM CITRATE 300-500 MG
15 SOLUTION, ORAL ORAL EVERY 6 HOURS
Refills: 0 | Status: DISCONTINUED | OUTPATIENT
Start: 2023-11-27 | End: 2023-11-27

## 2023-11-27 RX ORDER — AMPICILLIN TRIHYDRATE 250 MG
2 CAPSULE ORAL ONCE
Refills: 0 | Status: COMPLETED | OUTPATIENT
Start: 2023-11-27 | End: 2023-11-27

## 2023-11-27 RX ORDER — CHLORHEXIDINE GLUCONATE 213 G/1000ML
1 SOLUTION TOPICAL DAILY
Refills: 0 | Status: DISCONTINUED | OUTPATIENT
Start: 2023-11-27 | End: 2023-11-27

## 2023-11-27 RX ORDER — DIBUCAINE 1 %
1 OINTMENT (GRAM) RECTAL EVERY 6 HOURS
Refills: 0 | Status: DISCONTINUED | OUTPATIENT
Start: 2023-11-27 | End: 2023-11-29

## 2023-11-27 RX ORDER — DIPHENHYDRAMINE HCL 50 MG
25 CAPSULE ORAL EVERY 6 HOURS
Refills: 0 | Status: DISCONTINUED | OUTPATIENT
Start: 2023-11-27 | End: 2023-11-29

## 2023-11-27 RX ORDER — OXYCODONE HYDROCHLORIDE 5 MG/1
5 TABLET ORAL
Refills: 0 | Status: DISCONTINUED | OUTPATIENT
Start: 2023-11-27 | End: 2023-11-29

## 2023-11-27 RX ORDER — SODIUM CHLORIDE 9 MG/ML
1000 INJECTION, SOLUTION INTRAVENOUS
Refills: 0 | Status: DISCONTINUED | OUTPATIENT
Start: 2023-11-27 | End: 2023-11-27

## 2023-11-27 RX ORDER — OXYCODONE HYDROCHLORIDE 5 MG/1
5 TABLET ORAL ONCE
Refills: 0 | Status: DISCONTINUED | OUTPATIENT
Start: 2023-11-27 | End: 2023-11-29

## 2023-11-27 RX ORDER — IBUPROFEN 200 MG
600 TABLET ORAL EVERY 6 HOURS
Refills: 0 | Status: COMPLETED | OUTPATIENT
Start: 2023-11-27 | End: 2024-10-25

## 2023-11-27 RX ORDER — BENZOCAINE 10 %
1 GEL (GRAM) MUCOUS MEMBRANE EVERY 6 HOURS
Refills: 0 | Status: DISCONTINUED | OUTPATIENT
Start: 2023-11-27 | End: 2023-11-29

## 2023-11-27 RX ORDER — OXYTOCIN 10 UNIT/ML
333.33 VIAL (ML) INJECTION
Qty: 20 | Refills: 0 | Status: COMPLETED | OUTPATIENT
Start: 2023-11-27 | End: 2023-11-27

## 2023-11-27 RX ORDER — AMPICILLIN TRIHYDRATE 250 MG
1 CAPSULE ORAL EVERY 4 HOURS
Refills: 0 | Status: DISCONTINUED | OUTPATIENT
Start: 2023-11-27 | End: 2023-11-27

## 2023-11-27 RX ORDER — KETOROLAC TROMETHAMINE 30 MG/ML
30 SYRINGE (ML) INJECTION ONCE
Refills: 0 | Status: DISCONTINUED | OUTPATIENT
Start: 2023-11-27 | End: 2023-11-27

## 2023-11-27 RX ORDER — AER TRAVELER 0.5 G/1
1 SOLUTION RECTAL; TOPICAL EVERY 4 HOURS
Refills: 0 | Status: DISCONTINUED | OUTPATIENT
Start: 2023-11-27 | End: 2023-11-29

## 2023-11-27 RX ORDER — TETANUS TOXOID, REDUCED DIPHTHERIA TOXOID AND ACELLULAR PERTUSSIS VACCINE, ADSORBED 5; 2.5; 8; 8; 2.5 [IU]/.5ML; [IU]/.5ML; UG/.5ML; UG/.5ML; UG/.5ML
0.5 SUSPENSION INTRAMUSCULAR ONCE
Refills: 0 | Status: DISCONTINUED | OUTPATIENT
Start: 2023-11-27 | End: 2023-11-29

## 2023-11-27 RX ORDER — OXYTOCIN 10 UNIT/ML
4 VIAL (ML) INJECTION
Qty: 30 | Refills: 0 | Status: DISCONTINUED | OUTPATIENT
Start: 2023-11-27 | End: 2023-11-29

## 2023-11-27 RX ORDER — SODIUM CHLORIDE 9 MG/ML
3 INJECTION INTRAMUSCULAR; INTRAVENOUS; SUBCUTANEOUS EVERY 8 HOURS
Refills: 0 | Status: DISCONTINUED | OUTPATIENT
Start: 2023-11-27 | End: 2023-11-29

## 2023-11-27 RX ORDER — MAGNESIUM HYDROXIDE 400 MG/1
30 TABLET, CHEWABLE ORAL
Refills: 0 | Status: DISCONTINUED | OUTPATIENT
Start: 2023-11-27 | End: 2023-11-29

## 2023-11-27 RX ORDER — SIMETHICONE 80 MG/1
80 TABLET, CHEWABLE ORAL EVERY 4 HOURS
Refills: 0 | Status: DISCONTINUED | OUTPATIENT
Start: 2023-11-27 | End: 2023-11-29

## 2023-11-27 RX ORDER — HYDROCORTISONE 1 %
1 OINTMENT (GRAM) TOPICAL EVERY 6 HOURS
Refills: 0 | Status: DISCONTINUED | OUTPATIENT
Start: 2023-11-27 | End: 2023-11-29

## 2023-11-27 RX ORDER — PRAMOXINE HYDROCHLORIDE 150 MG/15G
1 AEROSOL, FOAM RECTAL EVERY 4 HOURS
Refills: 0 | Status: DISCONTINUED | OUTPATIENT
Start: 2023-11-27 | End: 2023-11-29

## 2023-11-27 RX ORDER — IBUPROFEN 200 MG
600 TABLET ORAL EVERY 6 HOURS
Refills: 0 | Status: DISCONTINUED | OUTPATIENT
Start: 2023-11-27 | End: 2023-11-29

## 2023-11-27 RX ORDER — LANOLIN
1 OINTMENT (GRAM) TOPICAL EVERY 6 HOURS
Refills: 0 | Status: DISCONTINUED | OUTPATIENT
Start: 2023-11-27 | End: 2023-11-29

## 2023-11-27 RX ORDER — ONDANSETRON 8 MG/1
4 TABLET, FILM COATED ORAL ONCE
Refills: 0 | Status: COMPLETED | OUTPATIENT
Start: 2023-11-27 | End: 2023-11-27

## 2023-11-27 RX ORDER — OXYTOCIN 10 UNIT/ML
41.67 VIAL (ML) INJECTION
Qty: 20 | Refills: 0 | Status: DISCONTINUED | OUTPATIENT
Start: 2023-11-27 | End: 2023-11-29

## 2023-11-27 RX ADMIN — ONDANSETRON 4 MILLIGRAM(S): 8 TABLET, FILM COATED ORAL at 12:41

## 2023-11-27 RX ADMIN — SODIUM CHLORIDE 125 MILLILITER(S): 9 INJECTION, SOLUTION INTRAVENOUS at 08:04

## 2023-11-27 RX ADMIN — Medication 975 MILLIGRAM(S): at 23:42

## 2023-11-27 RX ADMIN — Medication 600 MILLIGRAM(S): at 21:19

## 2023-11-27 RX ADMIN — Medication 975 MILLIGRAM(S): at 17:58

## 2023-11-27 RX ADMIN — SODIUM CHLORIDE 3 MILLILITER(S): 9 INJECTION INTRAMUSCULAR; INTRAVENOUS; SUBCUTANEOUS at 22:00

## 2023-11-27 RX ADMIN — Medication 1000 MILLIUNIT(S)/MIN: at 14:00

## 2023-11-27 RX ADMIN — Medication 108 GRAM(S): at 12:10

## 2023-11-27 RX ADMIN — Medication 30 MILLIGRAM(S): at 15:55

## 2023-11-27 RX ADMIN — SODIUM CHLORIDE 125 MILLILITER(S): 9 INJECTION, SOLUTION INTRAVENOUS at 08:05

## 2023-11-27 RX ADMIN — Medication 975 MILLIGRAM(S): at 18:40

## 2023-11-27 RX ADMIN — Medication 600 MILLIGRAM(S): at 22:00

## 2023-11-27 RX ADMIN — Medication 200 GRAM(S): at 08:04

## 2023-11-27 RX ADMIN — SODIUM CHLORIDE 125 MILLILITER(S): 9 INJECTION, SOLUTION INTRAVENOUS at 10:25

## 2023-11-27 NOTE — OB PROVIDER H&P - HISTORY OF PRESENT ILLNESS
32yo  @38w5d  p/f ROL. Patient has been carmel since 2:30am every 5-7 minutes. She has passed pink-tinged mucus but no bright red blood. -LOF, +FM. Pt denies fever, chills, nausea, vomiting, diarrhea, headache, constipation, dizziness, syncope, chest pain, palpitations, shortness of breath, dysuria, urgency, frequency.    PNC: OB=Bradly Andres. c/b anemia  GBS: pos  EFW: 3800g    ObHx:  FT 9#1 , SAB s/p D&C 2019, TOP s/p D&C   GynHx: HPV+ pap in  with normal paps since, -c/f/STI  MedHx: denies  SrgHx: D&Cx2, R knee meniscus repair  PsychHx: denies  SocialHx: denies T/E/D  AllergyHx: denies  RxHx: PNV, iron, Zofran PRN

## 2023-11-27 NOTE — OB RN PATIENT PROFILE - FALL HARM RISK - UNIVERSAL INTERVENTIONS
Bed in lowest position, wheels locked, appropriate side rails in place/Call bell, personal items and telephone in reach/Instruct patient to call for assistance before getting out of bed or chair/Non-slip footwear when patient is out of bed/[place] to call system/Physically safe environment - no spills, clutter or unnecessary equipment/Purposeful Proactive Rounding/Room/bathroom lighting operational, light cord in reach

## 2023-11-27 NOTE — OB RN PATIENT PROFILE - FUNCTIONAL ASSESSMENT - DAILY ACTIVITY 3.
[FreeTextEntry1] : reviewed lipids from 3/22 on pt phone LDL was 135   will call the lab to get a copy of the report for the chart  4 = No assist / stand by assistance

## 2023-11-27 NOTE — PRE-ANESTHESIA EVALUATION ADULT - HEIGHT IN FEET
5 anterior cervical L/supraclavicular L/posterior cervical L/anterior cervical R/posterior cervical R/supraclavicular R

## 2023-11-27 NOTE — OB RN DELIVERY SUMMARY - NS_SEPSISRSKCALC_OBGYN_ALL_OB_FT
EOS calculated successfully. EOS Risk Factor: 0.5/1000 live births ([facility] national incidence); GA=38w5d; Temp=99.1; ROM=2.533; GBS='Positive'; Antibiotics='Broad spectrum antibiotics > 4 hrs prior to birth'

## 2023-11-27 NOTE — OB PROVIDER DELIVERY SUMMARY - NSLOWPPHRISK_OBGYN_A_OB
Is This A New Presentation, Or A Follow-Up?: Acne
No previous uterine incision/[place] Pregnancy/Less than or equal to 4 previous vaginal births/No known bleeding disorder/No history of postpartum hemorrhage/No other PPH risks indicated

## 2023-11-27 NOTE — OB PROVIDER H&P - NS PANP COMMENT GEN_ALL_CORE FT
I agree with the above assessment  34 yo P1 admitted in early labor for pain management  Cat I Tracing    JR Dmitry POST

## 2023-11-27 NOTE — OB PROVIDER H&P - NSHPPHYSICALEXAM_GEN_ALL_CORE
Vital Signs Last 24 Hrs  T(C): 36.9 (27 Nov 2023 06:45), Max: 36.9 (27 Nov 2023 06:45)  T(F): 98.4 (27 Nov 2023 06:45), Max: 98.4 (27 Nov 2023 06:45)  HR: 95 (27 Nov 2023 06:51) (86 - 111)  BP: 130/83 (27 Nov 2023 06:45) (130/83 - 142/87)  BP(mean): --  RR: 16 (27 Nov 2023 06:45) (16 - 16)  SpO2: 97% (27 Nov 2023 06:51) (93% - 98%)    FHT: baseline 140, moderate variability, + accelerations, - decelerations   TOCO: CTX q3min

## 2023-11-27 NOTE — OB PROVIDER LABOR PROGRESS NOTE - ASSESSMENT
- will AROM with next exam  - pt to advise with SROM/urge to push    JR Dmitry POST 
34 yo P1 at 38.5 wks  early labor  Cat I Tracing  GBS pos    Plan   - expectant management  - will start pitocin if contractions space out    JR Dmitry POST

## 2023-11-27 NOTE — OB PROVIDER H&P - ASSESSMENT
Pt is a 33y  @38.5wks being admitted in labor.    Plan  1. Admit to LND. Routine Labs. IVF.  2. Expectant management  3. Fetus: cat 1 tracing. VTX. Continuous EFM. Sono. No concerns.  4. Prenatal issues: none  5. GBS neg  6. Pain: IV pain meds/epidural PRN    Patient discussed with attending physician, Bradly Andres.    Melissa Rudolph, PGY2

## 2023-11-27 NOTE — PRE-ANESTHESIA EVALUATION ADULT - NSANTHDIETYNSD_GEN_ALL_CORE
Interval History: NAEON. Neuro stable. HV drain with 25 output. Pain controlled.     Medications:  Continuous Infusions:   lactated ringers 100 mL/hr at 09/11/22 1005     Scheduled Meds:   dexamethasone  4 mg Intravenous Q12H    Followed by    [START ON 9/13/2022] dexamethasone  2 mg Intravenous Q12H    enoxaparin  1 mg/kg Subcutaneous Q12H    folic acid  1,000 mcg Oral Daily    levetiracetam IV  500 mg Intravenous Q12H    nortriptyline  25 mg Oral QHS    olanzapine zydis  5 mg Oral QHS    [START ON 9/12/2022] pantoprozole (PROTONIX) IV  40 mg Intravenous Daily    polyethylene glycol  17 g Oral BID    senna-docusate 8.6-50 mg  2 tablet Oral QHS     PRN Meds:aluminum-magnesium hydroxide-simethicone, dextrose 10%, dextrose 10%, glucagon (human recombinant), glucose, glucose, HYDROmorphone, HYDROmorphone, insulin aspart U-100, labetalol, methocarbamoL, naloxone, prochlorperazine, promethazine (PHENERGAN) IVPB, sodium chloride 0.9%     Review of Systems  Objective:     Weight: 84.4 kg (186 lb)  Body mass index is 37.57 kg/m².  Vital Signs (Most Recent):  Temp: 98.5 °F (36.9 °C) (09/11/22 1100)  Pulse: 88 (09/11/22 1100)  Resp: 16 (09/11/22 1100)  BP: (!) 163/77 (09/11/22 1100)  SpO2: 95 % (09/11/22 1100)   Vital Signs (24h Range):  Temp:  [97.4 °F (36.3 °C)-98.5 °F (36.9 °C)] 98.5 °F (36.9 °C)  Pulse:  [] 88  Resp:  [16-20] 16  SpO2:  [93 %-97 %] 95 %  BP: (142-181)/(77-99) 163/77     Date 09/11/22 0700 - 09/12/22 0659   Shift 7041-2624 5523-7272 0731-3716 24 Hour Total   INTAKE   P.O. 0   0   Shift Total(mL/kg) 0(0)   0(0)   OUTPUT   Urine(mL/kg/hr) 475   475   Shift Total(mL/kg) 475(5.6)   475(5.6)   Weight (kg) 84.4 84.4 84.4 84.4                          Closed/Suction Drain 09/05/22 1707 Right;Posterior Neck Accordion 10 Fr. (Active)   Site Description Unable to view 09/10/22 0730   Dressing Type Other (Comment) 09/09/22 1929   Dressing Status Clean;Dry 09/09/22 1929   Dressing Intervention Integrity  maintained 09/08/22 0800   Drainage None 09/09/22 1929   Status To bulb suction 09/10/22 0730   Output (mL) 0 mL 09/09/22 1929       Female External Urinary Catheter 09/06/22 1740 (Active)   Skin no redness;no breakdown 09/10/22 0730   Tolerance no signs/symptoms of discomfort 09/10/22 0730   Suction Continuous suction at 70 mmHg 09/09/22 1929   Date of last wick change 09/09/22 09/09/22 1929   Time of last wick change 1147 09/09/22 1136   Output (mL) 100 mL 09/10/22 0931       Neurosurgery Physical Exam  General: well developed  Head: normocephalic, atraumatic  Neck: c-collar in place  Neurologic: Alert and oriented. Thought content appropriate.  GCS: E4 V5 M6. GCS Total: 15  Mental Status: Awake, Alert, Oriented x 4  Language: No aphasia  Speech: No dysarthria  Cranial nerves: face symmetric, tongue midline, CN II-XII grossly intact.   Eyes: pupils equal, round, reactive to light with accomodation, EOMI.   Pulmonary: normal respirations  Abdomen: soft  Sensory: intact to light touch throughout  Motor Strength: Moves all extremities spontaneously. No abnormal movements seen.      Strength   Deltoids Triceps Biceps Wrist Extension Wrist Flexion Hand    Upper: R 2/5 4/5 4/5 4/5 4/5 4/5     L 3/5 4/5 4/5 4/5 4/5 4/5      Strength   Iliopsoas Quadriceps Knee  Flexion Tibialis  anterior Gastro- cnemius EHL   Lower: R 5/5 5/5 5/5 5/5 5/5 5/5     L 5/5 5/5 5/5 5/5 5/5 5/5    deltoid exam somewhat limited 2/2 edema in BUE.      Vascular: No LE edema, swelling noted to BUE, R>L  Skin: Skin is warm, dry and intact.     Posterior cervical incision: prineo tape in place without erythema, edema, or drainage.     Significant Labs:  Recent Labs   Lab 09/10/22  0906 09/11/22  0353   * 171*    139   K 3.9 3.9    96   CO2 36* 35*   BUN 18 16   CREATININE 0.5 0.6   CALCIUM 9.4 9.5       Recent Labs   Lab 09/10/22  0906 09/11/22  0353   WBC 7.56 8.42   HGB 10.5* 10.7*   HCT 33.2* 34.0*    212       No  results for input(s): LABPT, INR, APTT in the last 48 hours.  Microbiology Results (last 7 days)       Procedure Component Value Units Date/Time    Blood culture x two cultures. Draw prior to antibiotics. [[id number]] Collected: 09/02/22 1821    Order Status: Completed Specimen: Blood from Peripheral, Antecubital, Right Updated: 09/07/22 2012     Blood Culture, Routine No growth after 5 days.    Narrative:      Aerobic and anaerobic    Blood culture x two cultures. Draw prior to antibiotics. [[id number]] Collected: 09/02/22 1821    Order Status: Completed Specimen: Blood from Peripheral, Antecubital, Right Updated: 09/07/22 2012     Blood Culture, Routine No growth after 5 days.    Narrative:      Aerobic and anaerobic          All pertinent labs from the last 24 hours have been reviewed.    Significant Diagnostics:  I have reviewed and interpreted all pertinent imaging results/findings within the past 24 hours.  Physical Exam  Neurosurgery Physical Exam   Yes

## 2023-11-27 NOTE — OB PROVIDER DELIVERY SUMMARY - NSPROVIDERDELIVERYNOTE_OBGYN_ALL_OB_FT
Spontaneous vaginal delivery of liveborn infant boy from direct OA position  APGARS 9/9  Delayed cord clamping performed  Cord gases collected  Placenta delivered intact  Fundus firm  Intact cervix, vaginal walls and sulci  2nd deg lac repaired with 2.0 vicryl rapide  Excellent hemostasis  Count correct x 2    leah, graciem

## 2023-11-27 NOTE — OB RN DELIVERY SUMMARY - NSSELHIDDEN_OBGYN_ALL_OB_FT
[NS_DeliveryAttending1_OBGYN_ALL_OB_FT:[id number]==],[NS_DeliveryRN_OBGYN_ALL_OB_FT:[id number]=]

## 2023-11-28 ENCOUNTER — TRANSCRIPTION ENCOUNTER (OUTPATIENT)
Age: 33
End: 2023-11-28

## 2023-11-28 PROCEDURE — 59410 OBSTETRICAL CARE: CPT

## 2023-11-28 RX ORDER — FAMOTIDINE 10 MG/ML
20 INJECTION INTRAVENOUS DAILY
Refills: 0 | Status: DISCONTINUED | OUTPATIENT
Start: 2023-11-28 | End: 2023-11-29

## 2023-11-28 RX ADMIN — Medication 975 MILLIGRAM(S): at 05:19

## 2023-11-28 RX ADMIN — Medication 600 MILLIGRAM(S): at 21:30

## 2023-11-28 RX ADMIN — Medication 975 MILLIGRAM(S): at 12:20

## 2023-11-28 RX ADMIN — Medication 600 MILLIGRAM(S): at 15:25

## 2023-11-28 RX ADMIN — Medication 1 TABLET(S): at 11:36

## 2023-11-28 RX ADMIN — FAMOTIDINE 20 MILLIGRAM(S): 10 INJECTION INTRAVENOUS at 02:22

## 2023-11-28 RX ADMIN — Medication 975 MILLIGRAM(S): at 11:36

## 2023-11-28 RX ADMIN — Medication 600 MILLIGRAM(S): at 14:41

## 2023-11-28 RX ADMIN — Medication 600 MILLIGRAM(S): at 08:42

## 2023-11-28 RX ADMIN — Medication 975 MILLIGRAM(S): at 23:42

## 2023-11-28 RX ADMIN — Medication 600 MILLIGRAM(S): at 03:38

## 2023-11-28 RX ADMIN — Medication 600 MILLIGRAM(S): at 04:00

## 2023-11-28 RX ADMIN — Medication 975 MILLIGRAM(S): at 17:45

## 2023-11-28 RX ADMIN — Medication 975 MILLIGRAM(S): at 18:30

## 2023-11-28 RX ADMIN — Medication 600 MILLIGRAM(S): at 09:24

## 2023-11-28 RX ADMIN — Medication 600 MILLIGRAM(S): at 21:00

## 2023-11-28 RX ADMIN — Medication 975 MILLIGRAM(S): at 05:58

## 2023-11-28 RX ADMIN — Medication 975 MILLIGRAM(S): at 00:30

## 2023-11-28 NOTE — DISCHARGE NOTE OB - CARE PROVIDER_API CALL
Stanley Hill  Obstetrics and Gynecology  [address] [place] [address], [address]  [place] [address]  Phone: [phone]  Fax: [phone]  Follow Up Time:

## 2023-11-28 NOTE — DISCHARGE NOTE OB - PATIENT PORTAL LINK FT
You can access the FollowMyHealth Patient Portal offered by [facility] by registering at the following website: [email or web address]. By joining [facility]’s FollowMyHealth portal, you will also be able to view your health information using other applications (apps) compatible with our system.

## 2023-11-28 NOTE — PROGRESS NOTE ADULT - SUBJECTIVE AND OBJECTIVE BOX
OB Progress Note:  PPD#1    S: 34yo  PPD#1 s/p . Patient feels well. Pain is well controlled. She is tolerating a regular diet and passing flatus. She is voiding spontaneously, and ambulating without difficulty. Denies CP/SOB. Denies lightheadedness/dizziness. Denies N/V.    O:  Vitals:  Vital Signs Last 24 Hrs  T(C): 36.3 (2023 05:57), Max: 37.3 (2023 14:09)  T(F): 97.3 (2023 05:57), Max: 99.1 (2023 14:09)  HR: 69 (2023 05:57) (69 - 125)  BP: 126/84 (2023 05:57) (105/60 - 151/78)  BP(mean): 98 (2023 05:57) (83 - 104)  RR: 18 (2023 05:57) (16 - 18)  SpO2: 100% (2023 05:57) (86% - 100%)    Parameters below as of 2023 05:57  Patient On (Oxygen Delivery Method): room air        MEDICATIONS  (STANDING):  acetaminophen     Tablet .. 975 milliGRAM(s) Oral <User Schedule>  diphtheria/tetanus/pertussis (acellular) Vaccine (Adacel) 0.5 milliLiter(s) IntraMuscular once  ibuprofen  Tablet. 600 milliGRAM(s) Oral every 6 hours  oxytocin Infusion 41.667 milliUNIT(s)/Min (125 mL/Hr) IV Continuous <Continuous>  oxytocin Infusion. 4 milliUNIT(s)/Min (4 mL/Hr) IV Continuous <Continuous>  prenatal multivitamin 1 Tablet(s) Oral daily  sodium chloride 0.9% lock flush 3 milliLiter(s) IV Push every 8 hours      Labs:  Blood type: O Positive  Rubella IgG: RPR: Negative                          12.8   14.60<H> >-----------< 233    (  @ 07:56 )             37.3          Physical Exam:  General: NAD  Abdomen: soft, non-tender, non-distended, fundus firm  Vaginal: Lochia wnl  Extremities: No erythema/edema

## 2023-11-28 NOTE — PROGRESS NOTE ADULT - ASSESSMENT
A/P: 34yo PPD#1 s/p .  Patient is stable and doing well post-partum.   - Pain well controlled, continue current pain regimen  - Increase ambulation, SCDs when not ambulating  - Continue regular diet    Linda Arrington PGY1

## 2023-11-29 VITALS
OXYGEN SATURATION: 99 % | TEMPERATURE: 98 F | RESPIRATION RATE: 18 BRPM | HEART RATE: 64 BPM | SYSTOLIC BLOOD PRESSURE: 125 MMHG | DIASTOLIC BLOOD PRESSURE: 81 MMHG

## 2023-11-29 PROCEDURE — 86780 TREPONEMA PALLIDUM: CPT

## 2023-11-29 PROCEDURE — 86901 BLOOD TYPING SEROLOGIC RH(D): CPT

## 2023-11-29 PROCEDURE — 59050 FETAL MONITOR W/REPORT: CPT

## 2023-11-29 PROCEDURE — 36415 COLL VENOUS BLD VENIPUNCTURE: CPT

## 2023-11-29 PROCEDURE — 85025 COMPLETE CBC W/AUTO DIFF WBC: CPT

## 2023-11-29 PROCEDURE — 86900 BLOOD TYPING SEROLOGIC ABO: CPT

## 2023-11-29 PROCEDURE — 86850 RBC ANTIBODY SCREEN: CPT

## 2023-11-29 RX ADMIN — Medication 975 MILLIGRAM(S): at 05:36

## 2023-11-29 RX ADMIN — Medication 975 MILLIGRAM(S): at 12:29

## 2023-11-29 RX ADMIN — Medication 600 MILLIGRAM(S): at 08:57

## 2023-11-29 RX ADMIN — Medication 975 MILLIGRAM(S): at 12:59

## 2023-11-29 RX ADMIN — Medication 975 MILLIGRAM(S): at 00:15

## 2023-11-29 RX ADMIN — Medication 600 MILLIGRAM(S): at 08:27

## 2023-11-29 RX ADMIN — Medication 600 MILLIGRAM(S): at 03:30

## 2023-11-29 RX ADMIN — Medication 600 MILLIGRAM(S): at 04:00

## 2023-11-29 RX ADMIN — Medication 1 TABLET(S): at 12:29

## 2023-11-29 RX ADMIN — Medication 975 MILLIGRAM(S): at 06:00

## 2023-11-29 NOTE — PROGRESS NOTE ADULT - SUBJECTIVE AND OBJECTIVE BOX
S: Patient doing well. No complaints. Minimal lochia. Pain controlled.    O: Vital Signs Last 24 Hrs  T(C): 36.6 (2023 05:30), Max: 36.6 (2023 20:00)  T(F): 97.9 (2023 05:30), Max: 97.9 (2023 05:30)  HR: 64 (2023 05:30) (64 - 88)  BP: 125/81 (2023 05:30) (122/82 - 134/88)  BP(mean): 96 (2023 20:00) (96 - 96)  RR: 18 (2023 05:30) (16 - 18)  SpO2: 99% (2023 05:30) (97% - 99%)    Parameters below as of 2023 05:30  Patient On (Oxygen Delivery Method): room air        Gen: NAD  Abd: soft, Nontender, Nondistended, fundus firm  Ext: no tenderness, mild edema    Labs:      A: 33y PPD#2 s/p  doing well.    Plan:  Routine postpartum care  Encouraged out of bed  Regular diet    Discharge  CECILIO Hill MD

## 2024-01-12 ENCOUNTER — APPOINTMENT (OUTPATIENT)
Dept: OBGYN | Facility: CLINIC | Age: 34
End: 2024-01-12
Payer: COMMERCIAL

## 2024-01-12 PROCEDURE — 0503F POSTPARTUM CARE VISIT: CPT

## 2024-04-05 ENCOUNTER — OUTPATIENT (OUTPATIENT)
Dept: OUTPATIENT SERVICES | Facility: HOSPITAL | Age: 34
LOS: 1 days | Discharge: HOME | End: 2024-04-05
Payer: COMMERCIAL

## 2024-04-05 DIAGNOSIS — Z98.890 OTHER SPECIFIED POSTPROCEDURAL STATES: Chronic | ICD-10-CM

## 2024-04-10 PROCEDURE — 90792 PSYCH DIAG EVAL W/MED SRVCS: CPT | Mod: 93

## 2024-04-22 NOTE — OB RN PATIENT PROFILE - NS_ZIKATRAVEL_OBGYN_ALL_OB
[facility]     Progress Note    Patient Name: Fransico Cuenca  : 1956  MRN: [id number]  Primary Care Physician:  Camryn Salinas MD  Date of admission: 2024  Service date and time: 24 14:20 EDT  Subjective   Subjective     Chief Complaint: chest pain    HPI:  Patient feels better today, states that his breathing is improving although he does have some nasal congestion due to oxygen.  He states that he has been using his I-S more and is coughing up some thick dark sputum.      Objective   Objective     Vitals:   Temp:  [97.5 °F (36.4 °C)-98.3 °F (36.8 °C)] 97.6 °F (36.4 °C)  Heart Rate:  [] 66  Resp:  [18-24] 24  BP: (151-178)/() 178/94  Flow (L/min):  [2] 2  Physical Exam   General Appearance:  Alert, cooperative, no distress, appears stated age  Head:  Normocephalic, without obvious abnormality, atraumatic  Eyes:  PERRL, conjunctiva/corneas clear, EOM's intact, fundi benign, both eyes  Ears:  Normal TM's and external ear canals, both ears  Nose: Nares normal, septum midline, mucosa normal, no drainage or sinus tenderness  Throat: Lips, mucosa, and tongue normal; teeth and gums normal  Neck: Supple, symmetrical, trachea midline, no adenopathy, thyroid: not enlarged, symmetric, no tenderness/mass/nodules, no carotid bruit or JVD  Lungs:   Mild bilateral rhonchi  Heart:  Regular rate and rhythm, S1, S2 normal, no murmur, rub or gallop  Abdomen:  Soft, non-tender, bowel sounds active all four quadrants,  no masses, no organomegaly  Extremities: Extremities normal, atraumatic, no cyanosis or edema  Pulses: 2+ and symmetric  Skin: Skin color, texture, turgor normal, no rashes or lesions  Neurologic: Normal      Result Review    Result Review:  I have personally reviewed the results from the time of this admission to 2024 14:20 EDT and agree with these findings:  [x]  Laboratory list / accordion  [x]  Microbiology  [x]  Radiology  [x]  EKG/Telemetry   [x]  Cardiology/Vascular   []   Pathology  []  Old records  []  Other:        Assessment & Plan   Assessment / Plan       Active Hospital Problems:  Active Hospital Problems    Diagnosis     **NSTEMI (non-ST elevated myocardial infarction)      Plan:      NSTEMI  -Troponin is elevated over 500  -Continue IV heparin gtt  -Plan for LHC today     Probable acute on chronic decompensated heart failure  -Patient had elevated BNP with pulmonary edema on imaging  -Patient was initially diuresed but then developed EN and now is back on IV fluids with renal function at baseline  -RVP positive for human metapneumovirus which is likely also contributing to his dyspnea and may have also triggered heart failure exacerbation and NSTEMI  -Echo with preserved EF of 51 to 55%  -Continue steroid taper, off antibiotics     COPD exacerbation with acute respiratory failure with hypoxia  -Secondary to human metapneumovirus infection  -Continue steroids, bronchodilators  -Wean O2 as tolerated    Hypertension  -Continue home medications for BP control     Dyslipidemia  -Continue statin therapy     DM type II, controlled  -Continue sliding scale insulin     EN  -trend labs daily, avoid nephrotoxic meds  -getting IVF; can stop IV fluids after LHC  -Stop diuretics  -Nephrology following and managing    DVT prophylaxis:  Medical DVT prophylaxis orders are present.        CODE STATUS:   Level Of Support Discussed With: Patient  Code Status (Patient has no pulse and is not breathing): CPR (Attempt to Resuscitate)  Medical Interventions (Patient has pulse or is breathing): Full Support    Disposition: Plan for discharge home on 4/23.    Aristeo Geramn MD  [facility] Hospitalist Team  04/22/24  15:49 EDT     No

## 2024-05-03 PROCEDURE — 99213 OFFICE O/P EST LOW 20 MIN: CPT | Mod: 95

## 2024-05-03 PROCEDURE — 90833 PSYTX W PT W E/M 30 MIN: CPT | Mod: 95

## 2024-11-21 ENCOUNTER — APPOINTMENT (OUTPATIENT)
Dept: OBGYN | Facility: CLINIC | Age: 34
End: 2024-11-21
Payer: COMMERCIAL

## 2024-11-21 PROCEDURE — 76830 TRANSVAGINAL US NON-OB: CPT

## 2024-11-21 PROCEDURE — 99213 OFFICE O/P EST LOW 20 MIN: CPT | Mod: 25

## 2024-12-03 NOTE — OB RN DELIVERY SUMMARY - BABY A: APGAR 5 MIN HEART RATE, DELIVERY
"Daily Note     Today's date: 12/3/2024  Patient name: Fadi Busby  : 1966  MRN: [id number]  Referring provider: Kee Owen DO  Dx:   Encounter Diagnosis     ICD-10-CM    1. 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 No changes from last visit.       Objective: See treatment diary below      Assessment: Tolerated treatment well.  Able to progress treadmill speed which he tolerated well.  Challenged with jogging in place due to speed of movement on left lower extremity and weakness of left knee.  Would benefit from continued PT      Plan: Continue per plan of care.      Precautions: HTN, type II DM, DO NOT BILL FOR TA     HEP: walking program outside or on treadmill  Access Code: [email or web address]  URL: [email or web address]  Date: 2024  Prepared by: Ezequiel Iraheta    Exercises  - Jump in Place  - 1 x daily - 7 x weekly - 3 sets - 10 reps  - Running in place  - 1 x daily - 7 x weekly - 3 sets - 30 sec hold  - Standing Heel Raise with Support  - 1 x daily - 7 x weekly - 3 sets - 10 reps    **5# L ankle   Manuals 11/20 11/25 12/3  11/11 11/13                                       Neuro Re-Ed         Hurdles   High AW, no ball fwd x2 laps       Step ups       Over stair  + water ball. X15    Resisted walking       **75ft x6 dragging 25#    No weight 75ft x2    Blaze pods **over stair . 1.5min x3   Fwd/bwd fast over 18ft. 1min x3   **Over low hurdles, folded mat, step stool. 1.5min x4    Side stepping   On TM .6mph x2min ea   Resisted band**  20ft lap x2     Jogging place   BL UE support 30\"x3       Ramp  X12 fwd/bwd With tidal ball fwd/bwd x3, side x3 ea       Hopping In place BL UE support. 2x10  In place BL UE support. 2x10      Ther Ex         HIIT treadmill  5# ANKLE   **Base: 1% 1.6mph no UE    Fast: 1% 2.5-2.7mph no UE    12min total 5# L ankle  Bwd 2mph  Fast 3mph  1min " intervals  X6min total    Bwd  1mph x3min **Base: 1% 1.6mph no UE    Fast: 1% 2.8-3.0mph no UE    11min total  ------    Bwd 1mph, x3min  5# ANKLE   **Base: 1% 1.6mph no UE    Fast: 1% 2.4-2.6mph no UE    11min total  -----   5# ANKLE   **Base: 1% 1.6mph no UE    Fast: 1% 2.5-2.7mph no UE    12min total  -----   Leg press machine                                                               Ther Activity         DO NOT BILL FOR TA                  Gait Training                           Modalities                                   (2) more than 100 beats/min

## 2024-12-10 ENCOUNTER — OUTPATIENT (OUTPATIENT)
Dept: OUTPATIENT SERVICES | Facility: HOSPITAL | Age: 34
LOS: 1 days | End: 2024-12-10
Payer: COMMERCIAL

## 2024-12-10 VITALS
HEART RATE: 88 BPM | WEIGHT: 200.4 LBS | RESPIRATION RATE: 14 BRPM | SYSTOLIC BLOOD PRESSURE: 138 MMHG | TEMPERATURE: 99 F | DIASTOLIC BLOOD PRESSURE: 79 MMHG | HEIGHT: 64 IN | OXYGEN SATURATION: 98 %

## 2024-12-10 DIAGNOSIS — N84.0 POLYP OF CORPUS UTERI: ICD-10-CM

## 2024-12-10 DIAGNOSIS — Z98.890 OTHER SPECIFIED POSTPROCEDURAL STATES: Chronic | ICD-10-CM

## 2024-12-10 DIAGNOSIS — Z01.818 ENCOUNTER FOR OTHER PREPROCEDURAL EXAMINATION: ICD-10-CM

## 2024-12-10 LAB
ANION GAP SERPL CALC-SCNC: 13 MMOL/L — SIGNIFICANT CHANGE UP (ref 5–17)
BUN SERPL-MCNC: 12 MG/DL — SIGNIFICANT CHANGE UP (ref 7–23)
CALCIUM SERPL-MCNC: 9.6 MG/DL — SIGNIFICANT CHANGE UP (ref 8.4–10.5)
CHLORIDE SERPL-SCNC: 103 MMOL/L — SIGNIFICANT CHANGE UP (ref 96–108)
CO2 SERPL-SCNC: 22 MMOL/L — SIGNIFICANT CHANGE UP (ref 22–31)
CREAT SERPL-MCNC: 0.66 MG/DL — SIGNIFICANT CHANGE UP (ref 0.5–1.3)
EGFR: 117 ML/MIN/1.73M2 — SIGNIFICANT CHANGE UP
GLUCOSE SERPL-MCNC: 119 MG/DL — HIGH (ref 70–99)
HCT VFR BLD CALC: 37.3 % — SIGNIFICANT CHANGE UP (ref 34.5–45)
HGB BLD-MCNC: 12.2 G/DL — SIGNIFICANT CHANGE UP (ref 11.5–15.5)
MCHC RBC-ENTMCNC: 28.4 PG — SIGNIFICANT CHANGE UP (ref 27–34)
MCHC RBC-ENTMCNC: 32.7 G/DL — SIGNIFICANT CHANGE UP (ref 32–36)
MCV RBC AUTO: 86.7 FL — SIGNIFICANT CHANGE UP (ref 80–100)
NRBC # BLD: 0 /100 WBCS — SIGNIFICANT CHANGE UP (ref 0–0)
PLATELET # BLD AUTO: 320 K/UL — SIGNIFICANT CHANGE UP (ref 150–400)
POTASSIUM SERPL-MCNC: 3.9 MMOL/L — SIGNIFICANT CHANGE UP (ref 3.5–5.3)
POTASSIUM SERPL-SCNC: 3.9 MMOL/L — SIGNIFICANT CHANGE UP (ref 3.5–5.3)
RBC # BLD: 4.3 M/UL — SIGNIFICANT CHANGE UP (ref 3.8–5.2)
RBC # FLD: 12 % — SIGNIFICANT CHANGE UP (ref 10.3–14.5)
SODIUM SERPL-SCNC: 138 MMOL/L — SIGNIFICANT CHANGE UP (ref 135–145)
WBC # BLD: 11.27 K/UL — HIGH (ref 3.8–10.5)
WBC # FLD AUTO: 11.27 K/UL — HIGH (ref 3.8–10.5)

## 2024-12-10 PROCEDURE — 85027 COMPLETE CBC AUTOMATED: CPT

## 2024-12-10 PROCEDURE — G0463: CPT

## 2024-12-10 PROCEDURE — 80048 BASIC METABOLIC PNL TOTAL CA: CPT

## 2024-12-10 NOTE — H&P PST ADULT - ATTENDING COMMENTS
pt with symptomatic endometrial polyp   polyp 1.6 cm    for surgical management.    On call to OR  consents obtained  MARIBELL Thomas  Ob attg

## 2024-12-10 NOTE — H&P PST ADULT - HISTORY OF PRESENT ILLNESS
34 year old female , LMP 2024 with complaint of  Endometrial Polyp. Patient endorses heavy and prolonged menstrual periods with associated pelvic and back pain. Underwent Ultrasound and was found to have large endometrial polyp. She now presents to PST prior to scheduled D&C, Diagnostic Hysteroscopy, Resection of Endometrial Polyp w/AVETA on 2024.  Denies fever, chills, nausea, vomiting, gross hematuria, dysuria, changes in bowel habits or changes in weight.

## 2024-12-10 NOTE — H&P PST ADULT - RESPIRATORY
clear to auscultation bilaterally/no wheezes/no respiratory distress/breath sounds equal/good air movement

## 2024-12-10 NOTE — H&P PST ADULT - PROBLEM SELECTOR PLAN 1
D&C, Diagnostic Hysteroscopy, Resection of Endometrial Polyp w/AVETA  -cbc, bmp @ PST  -preop instructions discussed.  -ucg on admit

## 2024-12-19 ENCOUNTER — APPOINTMENT (OUTPATIENT)
Dept: OBGYN | Facility: HOSPITAL | Age: 34
End: 2024-12-19

## 2024-12-19 ENCOUNTER — TRANSCRIPTION ENCOUNTER (OUTPATIENT)
Age: 34
End: 2024-12-19

## 2024-12-19 ENCOUNTER — OUTPATIENT (OUTPATIENT)
Dept: OUTPATIENT SERVICES | Facility: HOSPITAL | Age: 34
LOS: 1 days | End: 2024-12-19
Payer: COMMERCIAL

## 2024-12-19 VITALS
DIASTOLIC BLOOD PRESSURE: 76 MMHG | OXYGEN SATURATION: 100 % | TEMPERATURE: 98 F | HEART RATE: 86 BPM | RESPIRATION RATE: 16 BRPM | SYSTOLIC BLOOD PRESSURE: 125 MMHG

## 2024-12-19 VITALS
WEIGHT: 200.4 LBS | RESPIRATION RATE: 16 BRPM | HEIGHT: 64 IN | SYSTOLIC BLOOD PRESSURE: 121 MMHG | DIASTOLIC BLOOD PRESSURE: 83 MMHG | TEMPERATURE: 98 F | OXYGEN SATURATION: 99 % | HEART RATE: 79 BPM

## 2024-12-19 DIAGNOSIS — N84.0 POLYP OF CORPUS UTERI: ICD-10-CM

## 2024-12-19 DIAGNOSIS — Z98.890 OTHER SPECIFIED POSTPROCEDURAL STATES: Chronic | ICD-10-CM

## 2024-12-19 PROCEDURE — 88305 TISSUE EXAM BY PATHOLOGIST: CPT | Mod: 26

## 2024-12-19 PROCEDURE — 88305 TISSUE EXAM BY PATHOLOGIST: CPT

## 2024-12-19 PROCEDURE — 58558 HYSTEROSCOPY BIOPSY: CPT

## 2024-12-19 PROCEDURE — C1782: CPT

## 2024-12-19 DEVICE — AVETA FLEX RESECTING DEVICE 2.9MM: Type: IMPLANTABLE DEVICE | Status: FUNCTIONAL

## 2024-12-19 DEVICE — AVETA WAVE PLUS RESECTING DEVICE 3.9MM: Type: IMPLANTABLE DEVICE | Status: FUNCTIONAL

## 2024-12-19 DEVICE — AVETA SMOL RESECTING DEVICE 2.9MM: Type: IMPLANTABLE DEVICE | Status: FUNCTIONAL

## 2024-12-19 RX ORDER — FENTANYL 12 UG/H
25 PATCH, EXTENDED RELEASE TRANSDERMAL
Refills: 0 | Status: DISCONTINUED | OUTPATIENT
Start: 2024-12-19 | End: 2024-12-19

## 2024-12-19 RX ORDER — ONDANSETRON HYDROCHLORIDE 4 MG/1
4 TABLET, FILM COATED ORAL ONCE
Refills: 0 | Status: ACTIVE | OUTPATIENT
Start: 2024-12-19 | End: 2025-11-17

## 2024-12-19 RX ORDER — APREPITANT 40 MG/1
40 CAPSULE ORAL ONCE
Refills: 0 | Status: COMPLETED | OUTPATIENT
Start: 2024-12-19 | End: 2024-12-19

## 2024-12-19 RX ORDER — 0.9 % SODIUM CHLORIDE 0.9 %
1000 INTRAVENOUS SOLUTION INTRAVENOUS
Refills: 0 | Status: ACTIVE | OUTPATIENT
Start: 2024-12-19 | End: 2025-11-17

## 2024-12-19 RX ORDER — SODIUM CHLORIDE 9 MG/ML
3 INJECTION, SOLUTION INTRAMUSCULAR; INTRAVENOUS; SUBCUTANEOUS EVERY 8 HOURS
Refills: 0 | Status: ACTIVE | OUTPATIENT
Start: 2024-12-19 | End: 2025-11-17

## 2024-12-19 RX ORDER — LIDOCAINE HCL 20 MG/ML
0.2 VIAL (ML) INJECTION ONCE
Refills: 0 | Status: COMPLETED | OUTPATIENT
Start: 2024-12-19 | End: 2024-12-19

## 2024-12-19 RX ADMIN — APREPITANT 40 MILLIGRAM(S): 40 CAPSULE ORAL at 06:50

## 2024-12-19 RX ADMIN — Medication 100 MILLILITER(S): at 06:06

## 2024-12-19 RX ADMIN — SODIUM CHLORIDE 3 MILLILITER(S): 9 INJECTION, SOLUTION INTRAMUSCULAR; INTRAVENOUS; SUBCUTANEOUS at 06:29

## 2024-12-19 NOTE — ASU DISCHARGE PLAN (ADULT/PEDIATRIC) - NURSING INSTRUCTIONS
OK to take Tylenol/Acetaminophen at 1:30PM TODAY (last dose @  7:30AM   in operating room) for pain and every 6 hours after as needed. OK to take Motrin/Ibuprofen at 1:30PM TODAY (last dose @  7:30AM   in operating room)  for pain and every 6 hours after as needed.

## 2024-12-19 NOTE — BRIEF OPERATIVE NOTE - OPERATION/FINDINGS
No lesions noted on cervix. Uterus was retroverted.  Single 1.6cm endometrial polyp protruding from right anterior aspect of the uterine cavity  Polyp resected using aveta hysteroscope, no additional polyps or lesions noted in the uterine cavity. Excellent hemostasis.

## 2024-12-19 NOTE — PRE-ANESTHESIA EVALUATION ADULT - NSANTHAPLANRD_GEN_ALL_CORE
Group Topic: BH Process Group     Date: 12/28/2021  Start Time: 11:00 AM  End Time: 11:50 AM  Facilitators: Lotus Tripathi LCSW; Fernando Bledsoe LCPC    Focus: Process Group  Number in attendance: 8  This group was done via virtual therapy on zoom due to covid 19.       The patient was quiet during most of the group. He did respond when asked directly. The patient discussed how 10 years ago he lost 3 people that were close to him in a very short period of time. He discussed how he now feels that a better way to honor them is to do what is right and being a better person.       Method: Group  Attendance: Present  Participation: Moderate  Patient Response: Attentive and Good eye contact  Mood: Depressed  Affect: Type: Depressed   Range: Blunted/flat   Congruency: Congruent   Stability: Labile  Behavior/Socialization: Appropriate to group and Cooperative  Thought Process: Focused  Task Performance: Follows directions  Patient Evaluation: Encouragement - needs prompts         general

## 2024-12-19 NOTE — ASU DISCHARGE PLAN (ADULT/PEDIATRIC) - FINANCIAL ASSISTANCE
[facility] provides services at a reduced cost to those who are determined to be eligible through [facility]’s financial assistance program. Information regarding [facility]’s financial assistance program can be found by going to [email or web address] or by calling [phone].

## 2024-12-19 NOTE — ASU DISCHARGE PLAN (ADULT/PEDIATRIC) - ASU DC SPECIAL INSTRUCTIONSFT
Regular diet as tolerated, regular activity as tolerated, no heavy lifting for first two weeks.  Nothing per vagina: no intercourse, tampons or douching.  Call your provider if you experience fevers, chills, worsening abdominal pain, inability to urinate or worsening vaginal bleeding.  Follow up with your provider in 2 weeks. Regular diet as tolerated, regular activity as tolerated, no heavy lifting for first two weeks.  Nothing per vagina: no intercourse, tampons or douching.    ******************************************************************************************   Call your provider if you experience fevers, chills, worsening abdominal pain, inability to urinate or worsening vaginal bleeding.  ******************************************************************************************   Follow up with your provider in 2 weeks.

## 2024-12-19 NOTE — ASU DISCHARGE PLAN (ADULT/PEDIATRIC) - CARE PROVIDER_API CALL
Josselyn Thomas  Obstetrics and Gynecology  [address] [place] [address], [address]  [place] [address]  Phone: [phone]  Fax: [phone]  Established Patient  Follow Up Time:

## 2024-12-19 NOTE — BRIEF OPERATIVE NOTE - NSICDXBRIEFPROCEDURE_GEN_ALL_CORE_FT
PROCEDURES:  Operative hysteroscopy with fluid management system 19-Dec-2024 08:09:43  Amber Rojas  Dilation and curettage, uterus 19-Dec-2024 08:09:52  Amber Rojas  Polypectomy, uterus 19-Dec-2024 08:09:58  Amber Rojas

## 2024-12-24 LAB — SURGICAL PATHOLOGY STUDY: SIGNIFICANT CHANGE UP

## 2024-12-31 ENCOUNTER — APPOINTMENT (OUTPATIENT)
Dept: OBGYN | Facility: CLINIC | Age: 34
End: 2024-12-31

## 2024-12-31 PROCEDURE — 99212 OFFICE O/P EST SF 10 MIN: CPT

## 2025-01-29 ENCOUNTER — APPOINTMENT (OUTPATIENT)
Dept: OBGYN | Facility: CLINIC | Age: 35
End: 2025-01-29
Payer: COMMERCIAL

## 2025-01-29 PROCEDURE — 96127 BRIEF EMOTIONAL/BEHAV ASSMT: CPT

## 2025-01-29 PROCEDURE — 99395 PREV VISIT EST AGE 18-39: CPT

## 2025-02-15 ENCOUNTER — NON-APPOINTMENT (OUTPATIENT)
Age: 35
End: 2025-02-15

## 2025-07-28 NOTE — OB PROVIDER DELIVERY SUMMARY - NSVAGINALEXAMCERT_OBGYN_ALL_OB
PDMP reviewed; no aberrant behavior identified, prescription authorized.   The Delivery OB Provider certifies that vaginal examination and/or abdominal examination after the delivery was done and no foreign body was found.

## (undated) DEVICE — SOL IRR POUR NS 0.9% 500ML

## (undated) DEVICE — VENODYNE/SCD SLEEVE CALF MEDIUM

## (undated) DEVICE — WARMING BLANKET UPPER ADULT

## (undated) DEVICE — PREP BETADINE KIT

## (undated) DEVICE — DRAPE 1/2 SHEET 40X57"

## (undated) DEVICE — GOWN TRIMAX LG

## (undated) DEVICE — PACK LITHOTOMY

## (undated) DEVICE — NSA-VIDEO TOWER - STRYKER: Type: DURABLE MEDICAL EQUIPMENT

## (undated) DEVICE — DRAPE LIGHT HANDLE COVER (GREEN)

## (undated) DEVICE — CURETTE ENDOMETRIAL GYNOSAMPLER 23.6CM

## (undated) DEVICE — GLV 6.5 PROTEXIS (WHITE)

## (undated) DEVICE — SOL IRR BAG NS 0.9% 3000ML

## (undated) DEVICE — POSITIONER FOAM EGG CRATE ULNAR 2PCS (PINK)

## (undated) DEVICE — AVETA CORAL HYSTEROSCOPE 4.6MM DISP

## (undated) DEVICE — OS FINDER

## (undated) DEVICE — Device

## (undated) DEVICE — ACCESSORY AVETA WASTE MANAGEMENT 3MM

## (undated) DEVICE — AVETA FLUID MANAGEMENT ACCESSORY